# Patient Record
Sex: FEMALE | Race: WHITE | NOT HISPANIC OR LATINO | Employment: FULL TIME | URBAN - METROPOLITAN AREA
[De-identification: names, ages, dates, MRNs, and addresses within clinical notes are randomized per-mention and may not be internally consistent; named-entity substitution may affect disease eponyms.]

---

## 2017-01-17 ENCOUNTER — OFFICE VISIT (OUTPATIENT)
Dept: OBGYN | Facility: CLINIC | Age: 42
End: 2017-01-17
Payer: COMMERCIAL

## 2017-01-17 VITALS
BODY MASS INDEX: 23.35 KG/M2 | HEIGHT: 64 IN | HEART RATE: 64 BPM | SYSTOLIC BLOOD PRESSURE: 98 MMHG | DIASTOLIC BLOOD PRESSURE: 76 MMHG | WEIGHT: 136.8 LBS

## 2017-01-17 DIAGNOSIS — Z09 POSTOPERATIVE EXAMINATION: ICD-10-CM

## 2017-01-17 DIAGNOSIS — Z09 POSTOPERATIVE EXAMINATION: Primary | ICD-10-CM

## 2017-01-17 LAB — HGB BLD-MCNC: 14.1 G/DL (ref 11.7–15.7)

## 2017-01-17 PROCEDURE — 36415 COLL VENOUS BLD VENIPUNCTURE: CPT | Performed by: OBSTETRICS & GYNECOLOGY

## 2017-01-17 PROCEDURE — 99024 POSTOP FOLLOW-UP VISIT: CPT | Performed by: OBSTETRICS & GYNECOLOGY

## 2017-01-17 PROCEDURE — 85018 HEMOGLOBIN: CPT | Performed by: OBSTETRICS & GYNECOLOGY

## 2017-01-17 NOTE — PROGRESS NOTES
SUBJECTIVE:                                                   Lolita Harris is a 41 year old female who presents to clinic today for the following health issue(s):  Patient presents with:  Surgical Followup: 16 Diagnostic laparoscopy, CO2 laser lysis of adhesions, CO2 laser vaporization of scar tissue and endometriosis.          HPI:  ***    No LMP recorded. Patient has had a hysterectomy..   Patient is not sexually active, .  Using hysterectomy for contraception.    reports that she has never smoked. She has never used smokeless tobacco.    STD testing offered?  Declined - not sexually active    Health maintenance updated:  yes    Today's PHQ-2 Score:   PHQ-2 (  [employer]) 2015   Q1: Little interest or pleasure in doing things 0   Q2: Feeling down, depressed or hopeless 0   PHQ-2 Score 0     Today's PHQ-9 Score: No flowsheet data found.  Today's PIPO-7 Score: No flowsheet data found.    Problem list and histories reviewed & adjusted, as indicated.  Additional history: as documented.    Patient Active Problem List   Diagnosis     Pelvic pain in female     Past Surgical History   Procedure Laterality Date     Laparoscopy operative adult  95,97,2001 rso,10 darlyn     Laparoscopy,vag hysterectomy       lso      section       Laser co2 laparoscopy diagnostic, lysis adhesions, combined N/A 2016     Procedure: COMBINED LASER CO2 LAPAROSCOPY DIAGNOSTIC, LYSIS ADHESIONS;  Surgeon: Zay Archuleta MD;  [facility]: [facility]      Social History   Substance Use Topics     Smoking status: Never Smoker      Smokeless tobacco: Never Used     Alcohol Use: 0.0 oz/week     0 Standard drinks or equivalent per week      Comment: rarely      Problem (# of Occurrences) Relation (Name,Age of Onset)    Hyperlipidemia (1) Father    Thyroid Disease (1) Father            Current Outpatient Prescriptions   Medication Sig     estradiol (ESTRACE) 1 MG tablet Take 1 tablet (1 mg) by mouth daily      "Calcium-Magnesium-Zinc 333-133-5 MG TABS      guaiFENesin (MUCINEX) 600 MG 12 hr tablet      levothyroxine (SYNTHROID, LEVOTHROID) 75 MCG tablet      fluticasone (FLONASE) 50 MCG/ACT nasal spray as needed     No current facility-administered medications for this visit.     Allergies   Allergen Reactions     Penicillins Rash     Childhood rxn       ROS:  12 point review of systems negative other than symptoms noted below.  Gastrointestinal: Abdominal Pain    OBJECTIVE:     BP 98/76 mmHg  Pulse 64  Ht 5' 4\" (1.626 m)  Wt 136 lb 12.8 oz (62.052 kg)  BMI 23.47 kg/m2  Body mass index is 23.47 kg/(m^2).    Exam:  {[facility] EXAM CHOICES:092454}     In-Clinic Test Results:  Results for orders placed or performed in visit on 01/17/17 (from the past 24 hour(s))   Hemoglobin   Result Value Ref Range    Hemoglobin 14.1 11.7 - 15.7 g/dL       ASSESSMENT/PLAN:                                                        ICD-10-CM    1. Postoperative examination Z09 Hemoglobin       There are no Patient Instructions on file for this visit.    ***    Zay Archuleta MD  [facility]    "

## 2017-01-17 NOTE — PROGRESS NOTES
The patient is seen for her postoperative visit. Her incisions look good. The operative findings were reviewed at length. She will return to see us in 3 months time.

## 2017-03-20 DIAGNOSIS — N95.1 MENOPAUSAL AND FEMALE CLIMACTERIC STATES: ICD-10-CM

## 2017-03-20 RX ORDER — ESTRADIOL 1 MG/1
1 TABLET ORAL DAILY
Qty: 90 TABLET | Refills: 1 | Status: SHIPPED | OUTPATIENT
Start: 2017-03-20 | End: 2020-11-14

## 2017-04-17 ENCOUNTER — OFFICE VISIT (OUTPATIENT)
Dept: OBGYN | Facility: CLINIC | Age: 42
End: 2017-04-17
Payer: COMMERCIAL

## 2017-04-17 VITALS
WEIGHT: 136 LBS | HEART RATE: 60 BPM | SYSTOLIC BLOOD PRESSURE: 100 MMHG | BODY MASS INDEX: 23.22 KG/M2 | DIASTOLIC BLOOD PRESSURE: 56 MMHG | HEIGHT: 64 IN

## 2017-04-17 DIAGNOSIS — N80.9 ENDOMETRIOSIS: Primary | ICD-10-CM

## 2017-04-17 PROCEDURE — 99213 OFFICE O/P EST LOW 20 MIN: CPT | Performed by: OBSTETRICS & GYNECOLOGY

## 2017-04-17 NOTE — PROGRESS NOTES
SUBJECTIVE:                                                   Lolita Harris is a 42 year old female who presents to clinic today for the following health issue(s):  Patient presents with:  Surgical Followup: CO2 Laser 12/27/16    HPI: The patient is seen in follow-up for a long history of endometriosis. She has had numerous procedures and a eventual complete cleanout. Her last surgery was in December. She is improved at this time. She is on 1 mg of estrogen replacement q. day and stable.Constitutional:  Appearance: Well nourished, well developed alert, in no acute distress  Gastrointestinal:  Abdominal Examination:  Abdomen nontender to palpation, tone normal without rigidity or guarding, no masses present, umbilicus without lesions; Liver/Spleen:  No hepatomegaly present, liver nontender to palpation; Hernias:  No hernias present  Lymphatic: Lymph Nodes:  No other lymphadenopathy present  Skin:General Inspection:  No rashes present, no lesions present, no areas of discoloration; Genitalia and Groin:  No rashes present, no lesions present, no areas of discoloration, no masses present.  Neurologic/Psychiatric:  Mental Status:  Oriented X3   Pelvic Exam:  External Genitalia:     Normal appearance for age, no discharge present, no tenderness present, no inflammatory lesions present, color normal  Vagina:     Normal vaginal vault without central or paravaginal defects, no discharge present, no inflammatory lesions present, no masses present  Bladder:     Nontender to palpation  Urethra:   Urethral Body:  Urethra palpation normal, urethra structural support normal   Urethral Meatus:  No erythema or lesions present  Cervix:     Surgically absent  Uterus:     Surgically absent  Adnexa:     Surgically absent  Perineum:     Perineum within normal limits, no evidence of trauma, no rashes or skin lesions present  Anus:     Anus within normal limits, no hemorrhoids present  Inguinal Lymph Nodes:     No lymphadenopathy  present      No LMP recorded. Patient has had a hysterectomy..   Patient is sexually active, .  Using hysterectomy for contraception.    reports that she has never smoked. She has never used smokeless tobacco.    STD testing offered?  Declined    Health maintenance updated:  yes    Today's PHQ-2 Score:   PHQ-2 (  [employer]) 2015   Q1: Little interest or pleasure in doing things 0   Q2: Feeling down, depressed or hopeless 0   PHQ-2 Score 0     Today's PHQ-9 Score: No flowsheet data found.  Today's PIPO-7 Score: No flowsheet data found.    Problem list and histories reviewed & adjusted, as indicated.  Additional history: as documented.    Patient Active Problem List   Diagnosis     Pelvic pain in female     Past Surgical History:   Procedure Laterality Date      SECTION       LAPAROSCOPY OPERATIVE ADULT  95,97,2001 rso,10 darlyn     LAPAROSCOPY,VAG HYSTERECTOMY      lso     LASER CO2 LAPAROSCOPY DIAGNOSTIC, LYSIS ADHESIONS, COMBINED N/A 2016    Procedure: COMBINED LASER CO2 LAPAROSCOPY DIAGNOSTIC, LYSIS ADHESIONS;  Surgeon: Zay Archuleta MD;  Location: [facility]      Social History   Substance Use Topics     Smoking status: Never Smoker     Smokeless tobacco: Never Used     Alcohol use 0.0 oz/week     0 Standard drinks or equivalent per week      Comment: rarely      Problem (# of Occurrences) Relation (Name,Age of Onset)    Hyperlipidemia (1) Father    Thyroid Disease (1) Father            Current Outpatient Prescriptions   Medication Sig     estradiol (ESTRACE) 1 MG tablet Take 1 tablet (1 mg) by mouth daily     Calcium-Magnesium-Zinc 333-133-5 MG TABS      guaiFENesin (MUCINEX) 600 MG 12 hr tablet      levothyroxine (SYNTHROID, LEVOTHROID) 75 MCG tablet      fluticasone (FLONASE) 50 MCG/ACT nasal spray as needed     No current facility-administered medications for this visit.      Allergies   Allergen Reactions     Penicillins Rash     Childhood rxn       ROS:  12 point review of systems  "negative other than symptoms noted below.    OBJECTIVE:     /56  Pulse 60  Ht 5' 4\" (1.626 m)  Wt 136 lb (61.7 kg)  Breastfeeding? No  BMI 23.34 kg/m2  Body mass index is 23.34 kg/(m^2).    Exam:  Constitutional:  Appearance: Well nourished, well developed alert, in no acute distress  Gastrointestinal:  Abdominal Examination:  Abdomen nontender to palpation, tone normal without rigidity or guarding, no masses present, umbilicus without lesions; Liver/Spleen:  No hepatomegaly present, liver nontender to palpation; Hernias:  No hernias present  Lymphatic: Lymph Nodes:  No other lymphadenopathy present  Skin:General Inspection:  No rashes present, no lesions present, no areas of discoloration; Genitalia and Groin:  No rashes present, no lesions present, no areas of discoloration, no masses present.  Neurologic/Psychiatric:  Mental Status:  Oriented X3   Pelvic Exam:  External Genitalia:     Normal appearance for age, no discharge present, no tenderness present, no inflammatory lesions present, color normal  Vagina:     Normal vaginal vault without central or paravaginal defects, no discharge present, no inflammatory lesions present, no masses present  Bladder:     Nontender to palpation  Urethra:   Urethral Body:  Urethra palpation normal, urethra structural support normal   Urethral Meatus:  No erythema or lesions present  Cervix:     Surgically absent  Uterus:     Surgically absent  Adnexa:     Surgically absent  Perineum:     Perineum within normal limits, no evidence of trauma, no rashes or skin lesions present  Anus:     Anus within normal limits, no hemorrhoids present  Inguinal Lymph Nodes:     No lymphadenopathy present     In-Clinic Test Results:      ASSESSMENT/PLAN:                                                      The patient is stable at this time with a totally benign exam. We will see her back in 6 months. We have asked her to cut her estrogen and have for September and " October.            Zay Archuleta MD  [facility]

## 2017-04-17 NOTE — MR AVS SNAPSHOT
"              After Visit Summary   2017    Lolita Harris    MRN: [id number]           Patient Information     Date Of Birth          1975        Visit Information        Provider Department      2017 10:45 AM Zay Archuleta MD [facility]        Today's Diagnoses     Endometriosis    -  1       Follow-ups after your visit        Who to contact     If you have questions or need follow up information about today's clinic visit or your schedule please contact [facility] directly at [phone].  Normal or non-critical lab and imaging results will be communicated to you by Knimb[employer]art, letter or phone within 4 business days after the clinic has received the results. If you do not hear from us within 7 days, please contact the clinic through [employer]imbushart or phone. If you have a critical or abnormal lab result, we will notify you by phone as soon as possible.  Submit refill requests through All [employer] Leads or call your pharmacy and they will forward the refill request to us. Please allow 3 business days for your refill to be completed.          Additional Information About Your Visit        MyChart Information     [employer] lets you send messages to your doctor, view your test results, renew your prescriptions, schedule appointments and more. To sign up, go to [email or web address] . Click on \"Log in\" on the left side of the screen, which will take you to the Welcome page. Then click on \"Sign up Now\" on the right side of the page.     You will be asked to enter the access code listed below, as well as some personal information. Please follow the directions to create your username and password.     Your access code is: [email or web address]  Expires: 2017 11:10 AM     Your access code will  in 90 days. If you need help or a new code, please call your [facility] or [phone].        Care EveryWhere ID     This is your Care EveryWhere ID. This could be used by other " "organizations to access your [facility] medical records  [id number]        Your Vitals Were     Pulse Height Breastfeeding? BMI (Body Mass Index)          60 5' 4\" (1.626 m) No 23.34 kg/m2         Blood Pressure from Last 3 Encounters:   04/17/17 100/56   01/17/17 98/76   12/27/16 121/82    Weight from Last 3 Encounters:   04/17/17 136 lb (61.7 kg)   01/17/17 136 lb 12.8 oz (62.1 kg)   12/27/16 137 lb 8 oz (62.4 kg)              Today, you had the following     No orders found for display       Primary Care Provider    None Specified       No primary provider on file.        Thank you!     Thank you for choosing [facility]  for your care. Our goal is always to provide you with excellent care. Hearing back from our patients is one way we can continue to improve our services. Please take a few minutes to complete the written survey that you may receive in the mail after your visit with us. Thank you!             Your Updated Medication List - Protect others around you: Learn how to safely use, store and throw away your medicines at www.disposemymeds.org.          This list is accurate as of: 4/17/17 12:19 PM.  Always use your most recent med list.                   Brand Name Dispense Instructions for use    Calcium-Magnesium-Zinc 333-133-5 MG Tabs          estradiol 1 MG tablet    ESTRACE    90 tablet    Take 1 tablet (1 mg) by mouth daily       fluticasone 50 MCG/ACT spray    FLONASE     as needed       levothyroxine 75 MCG tablet    SYNTHROID/LEVOTHROID         MUCINEX 600 MG 12 hr tablet   Generic drug:  guaiFENesin            "

## 2018-09-06 ENCOUNTER — TRANSFERRED RECORDS (OUTPATIENT)
Dept: HEALTH INFORMATION MANAGEMENT | Facility: CLINIC | Age: 43
End: 2018-09-06

## 2018-12-24 ENCOUNTER — OFFICE VISIT (OUTPATIENT)
Dept: RHEUMATOLOGY | Facility: CLINIC | Age: 43
End: 2018-12-24
Payer: COMMERCIAL

## 2018-12-24 VITALS
DIASTOLIC BLOOD PRESSURE: 63 MMHG | HEART RATE: 69 BPM | RESPIRATION RATE: 16 BRPM | TEMPERATURE: 97.2 F | WEIGHT: 139 LBS | BODY MASS INDEX: 23.73 KG/M2 | OXYGEN SATURATION: 97 % | HEIGHT: 64 IN | SYSTOLIC BLOOD PRESSURE: 99 MMHG

## 2018-12-24 DIAGNOSIS — M06.9 RHEUMATOID ARTHRITIS INVOLVING MULTIPLE SITES, UNSPECIFIED RHEUMATOID FACTOR PRESENCE: Primary | ICD-10-CM

## 2018-12-24 DIAGNOSIS — M19.90 INFLAMMATORY ARTHRITIS: ICD-10-CM

## 2018-12-24 DIAGNOSIS — D70.4 CYCLICAL NEUTROPENIA (H): ICD-10-CM

## 2018-12-24 LAB
ALT SERPL W P-5'-P-CCNC: 31 U/L (ref 0–50)
AST SERPL W P-5'-P-CCNC: 27 U/L (ref 0–45)
BASOPHILS # BLD AUTO: 0 10E9/L (ref 0–0.2)
BASOPHILS NFR BLD AUTO: 0.9 %
C3 SERPL-MCNC: 100 MG/DL (ref 76–169)
C4 SERPL-MCNC: 21 MG/DL (ref 15–50)
CREAT SERPL-MCNC: 0.77 MG/DL (ref 0.52–1.04)
CRP SERPL-MCNC: <2.9 MG/L (ref 0–8)
DIFFERENTIAL METHOD BLD: ABNORMAL
ENA RNP IGG SER IA-ACNC: <0.2 AI (ref 0–0.9)
ENA SCL70 IGG SER IA-ACNC: <0.2 AI (ref 0–0.9)
ENA SM IGG SER-ACNC: <0.2 AI (ref 0–0.9)
ENA SS-A IGG SER IA-ACNC: <0.2 AI (ref 0–0.9)
ENA SS-B IGG SER IA-ACNC: <0.2 AI (ref 0–0.9)
EOSINOPHIL # BLD AUTO: 0 10E9/L (ref 0–0.7)
EOSINOPHIL NFR BLD AUTO: 0.9 %
ERYTHROCYTE [DISTWIDTH] IN BLOOD BY AUTOMATED COUNT: 11.9 % (ref 10–15)
ERYTHROCYTE [SEDIMENTATION RATE] IN BLOOD BY WESTERGREN METHOD: 5 MM/H (ref 0–20)
GFR SERPL CREATININE-BSD FRML MDRD: >90 ML/MIN/{1.73_M2}
HCT VFR BLD AUTO: 41.5 % (ref 35–47)
HGB BLD-MCNC: 13.6 G/DL (ref 11.7–15.7)
IMM GRANULOCYTES # BLD: 0 10E9/L (ref 0–0.4)
IMM GRANULOCYTES NFR BLD: 0 %
LYMPHOCYTES # BLD AUTO: 1.4 10E9/L (ref 0.8–5.3)
LYMPHOCYTES NFR BLD AUTO: 41.1 %
MCH RBC QN AUTO: 29.8 PG (ref 26.5–33)
MCHC RBC AUTO-ENTMCNC: 32.8 G/DL (ref 31.5–36.5)
MCV RBC AUTO: 91 FL (ref 78–100)
MONOCYTES # BLD AUTO: 0.3 10E9/L (ref 0–1.3)
MONOCYTES NFR BLD AUTO: 7.9 %
NEUTROPHILS # BLD AUTO: 1.6 10E9/L (ref 1.6–8.3)
NEUTROPHILS NFR BLD AUTO: 49.2 %
PLATELET # BLD AUTO: 199 10E9/L (ref 150–450)
RBC # BLD AUTO: 4.57 10E12/L (ref 3.8–5.2)
RHEUMATOID FACT SER NEPH-ACNC: <20 IU/ML (ref 0–20)
WBC # BLD AUTO: 3.3 10E9/L (ref 4–11)

## 2018-12-24 PROCEDURE — 84460 ALANINE AMINO (ALT) (SGPT): CPT | Performed by: STUDENT IN AN ORGANIZED HEALTH CARE EDUCATION/TRAINING PROGRAM

## 2018-12-24 PROCEDURE — 84450 TRANSFERASE (AST) (SGOT): CPT | Performed by: STUDENT IN AN ORGANIZED HEALTH CARE EDUCATION/TRAINING PROGRAM

## 2018-12-24 PROCEDURE — 86704 HEP B CORE ANTIBODY TOTAL: CPT | Performed by: STUDENT IN AN ORGANIZED HEALTH CARE EDUCATION/TRAINING PROGRAM

## 2018-12-24 PROCEDURE — 87340 HEPATITIS B SURFACE AG IA: CPT | Performed by: STUDENT IN AN ORGANIZED HEALTH CARE EDUCATION/TRAINING PROGRAM

## 2018-12-24 PROCEDURE — 86200 CCP ANTIBODY: CPT | Performed by: STUDENT IN AN ORGANIZED HEALTH CARE EDUCATION/TRAINING PROGRAM

## 2018-12-24 PROCEDURE — 86140 C-REACTIVE PROTEIN: CPT | Performed by: STUDENT IN AN ORGANIZED HEALTH CARE EDUCATION/TRAINING PROGRAM

## 2018-12-24 PROCEDURE — 86803 HEPATITIS C AB TEST: CPT | Performed by: STUDENT IN AN ORGANIZED HEALTH CARE EDUCATION/TRAINING PROGRAM

## 2018-12-24 PROCEDURE — 85652 RBC SED RATE AUTOMATED: CPT | Performed by: STUDENT IN AN ORGANIZED HEALTH CARE EDUCATION/TRAINING PROGRAM

## 2018-12-24 PROCEDURE — 86160 COMPLEMENT ANTIGEN: CPT | Performed by: STUDENT IN AN ORGANIZED HEALTH CARE EDUCATION/TRAINING PROGRAM

## 2018-12-24 PROCEDURE — 86038 ANTINUCLEAR ANTIBODIES: CPT | Performed by: STUDENT IN AN ORGANIZED HEALTH CARE EDUCATION/TRAINING PROGRAM

## 2018-12-24 PROCEDURE — 99204 OFFICE O/P NEW MOD 45 MIN: CPT | Performed by: STUDENT IN AN ORGANIZED HEALTH CARE EDUCATION/TRAINING PROGRAM

## 2018-12-24 PROCEDURE — 86431 RHEUMATOID FACTOR QUANT: CPT | Performed by: STUDENT IN AN ORGANIZED HEALTH CARE EDUCATION/TRAINING PROGRAM

## 2018-12-24 PROCEDURE — 86225 DNA ANTIBODY NATIVE: CPT | Performed by: STUDENT IN AN ORGANIZED HEALTH CARE EDUCATION/TRAINING PROGRAM

## 2018-12-24 PROCEDURE — 86480 TB TEST CELL IMMUN MEASURE: CPT | Performed by: STUDENT IN AN ORGANIZED HEALTH CARE EDUCATION/TRAINING PROGRAM

## 2018-12-24 PROCEDURE — 36415 COLL VENOUS BLD VENIPUNCTURE: CPT | Performed by: STUDENT IN AN ORGANIZED HEALTH CARE EDUCATION/TRAINING PROGRAM

## 2018-12-24 PROCEDURE — 82565 ASSAY OF CREATININE: CPT | Performed by: STUDENT IN AN ORGANIZED HEALTH CARE EDUCATION/TRAINING PROGRAM

## 2018-12-24 PROCEDURE — 86235 NUCLEAR ANTIGEN ANTIBODY: CPT | Performed by: STUDENT IN AN ORGANIZED HEALTH CARE EDUCATION/TRAINING PROGRAM

## 2018-12-24 PROCEDURE — 85025 COMPLETE CBC W/AUTO DIFF WBC: CPT | Performed by: STUDENT IN AN ORGANIZED HEALTH CARE EDUCATION/TRAINING PROGRAM

## 2018-12-24 RX ORDER — PREDNISONE 5 MG/1
TABLET ORAL
Qty: 90 TABLET | Refills: 2 | Status: SHIPPED | OUTPATIENT
Start: 2018-12-24 | End: 2019-05-28

## 2018-12-24 ASSESSMENT — PAIN SCALES - GENERAL: PAINLEVEL: MILD PAIN (3)

## 2018-12-24 ASSESSMENT — MIFFLIN-ST. JEOR: SCORE: 1270.5

## 2018-12-24 NOTE — PATIENT INSTRUCTIONS
Patient Education     Hydroxychloroquine tablets  Brand Names: Plaquenil, Quineprox  What is this medicine?  HYDROXYCHLOROQUINE (ondina drox ee KLOR oh kwin) is used to treat rheumatoid arthritis and systemic lupus erythematosus. It is also used to treat malaria.  How should I use this medicine?  Take this medicine by mouth with a glass of water. Follow the directions on the prescription label. Avoid taking antacids within 4 hours of taking this medicine. It is best to separate these medicines by at least 4 hours. Do not cut, crush or chew this medicine. You can take it with or without food. If it upsets your stomach, take it with food. Take your medicine at regular intervals. Do not take your medicine more often than directed. Take all of your medicine as directed even if you think you are better. Do not skip doses or stop your medicine early.  Talk to your pediatrician regarding the use of this medicine in children. While this drug may be prescribed for selected conditions, precautions do apply.  What side effects may I notice from receiving this medicine?  Side effects that you should report to your doctor or health care professional as soon as possible:    allergic reactions like skin rash, itching or hives, swelling of the face, lips, or tongue    changes in vision    decreased hearing or ringing of the ears    redness, blistering, peeling or loosening of the skin, including inside the mouth    seizures    sensitivity to light    signs and symptoms of a dangerous change in heartbeat or heart rhythm like chest pain; dizziness; fast or irregular heartbeat; palpitations; feeling faint or lightheaded, falls; breathing problems    signs and symptoms of liver injury like dark yellow or brown urine; general ill feeling or flu-like symptoms; light-colored stools; loss of appetite; nausea; right upper belly pain; unusually weak or tired; yellowing of the eyes or skin    signs and symptoms of low blood sugar such as  feeling anxious; confusion; dizziness; increased hunger; unusually weak or tired; sweating; shakiness; cold; irritable; headache; blurred vision; fast heartbeat; loss of consciousness    uncontrollable head, mouth, neck, arm, or leg movements  Side effects that usually do not require medical attention (report to your doctor or health care professional if they continue or are bothersome):    anxious    diarrhea    dizziness    hair loss    headache    irritable    loss of appetite    nausea, vomiting    stomach pain  What may interact with this medicine?  Do not take this medicine with any of the following medications:    cisapride    dofetilide    dronedarone    live virus vaccines    penicillamine    pimozide    thioridazine    ziprasidone  This medicine may also interact with the following medications:    ampicillin    antacids    cimetidine    cyclosporine    digoxin    medicines for diabetes, like insulin, glipizide, glyburide    medicines for seizures like carbamazepine, phenobarbital, phenytoin    mefloquine    methotrexate    other medicines that prolong the QT interval (cause an abnormal heart rhythm)    praziquantel  What if I miss a dose?  If you miss a dose, take it as soon as you can. If it is almost time for your next dose, take only that dose. Do not take double or extra doses.  Where should I keep my medicine?  Keep out of the reach of children. In children, this medicine can cause overdose with small doses.  Store at room temperature between 15 and 30 degrees C (59 and 86 degrees F). Protect from moisture and light. Throw away any unused medicine after the expiration date.  What should I tell my health care provider before I take this medicine?  They need to know if you have any of these conditions:    diabetes    eye disease, vision problems    G6PD deficiency    history of blood diseases    history of irregular heartbeat    if you often drink alcohol    kidney disease    liver  disease    porphyria    psoriasis    seizures    an unusual or allergic reaction to chloroquine, hydroxychloroquine, other medicines, foods, dyes, or preservatives    pregnant or trying to get pregnant    breast-feeding  What should I watch for while using this medicine?  Tell your doctor or healthcare professional if your symptoms do not start to get better or if they get worse.  Avoid taking antacids within 4 hours of taking this medicine. It is best to separate these medicines by at least 4 hours.  Tell your doctor or health care professional right away if you have any change in your eyesight.  Your vision and blood may be tested before and during use of this medicine.  This medicine can make you more sensitive to the sun. Keep out of the sun. If you cannot avoid being in the sun, wear protective clothing and use sunscreen. Do not use sun lamps or tanning beds/booths.  NOTE:This sheet is a summary. It may not cover all possible information. If you have questions about this medicine, talk to your doctor, pharmacist, or health care provider. Copyright  2018 [employer]

## 2018-12-24 NOTE — NURSING NOTE
"Lolita Harris's goals for this visit include: Consult  She requests these members of her care team be copied on today's visit information: PCP    PCP: No Ref-Primary, Physician    Referring Provider:  No referring provider defined for this encounter.    BP 99/63   Pulse 69   Temp 97.2  F (36.2  C)   Resp 16   Ht 1.626 m (5' 4\")   Wt 63 kg (139 lb)   SpO2 97%   BMI 23.86 kg/m      Do you need any medication refills at today's visit? N    "

## 2018-12-24 NOTE — PROGRESS NOTES
Rheumatology Clinic Visit     Lolita Harris MRN# [id number]   YOB: 1975 Age: 43 year old     Date of Visit: Dec 24, 2018  Primary care provider: No Ref-Primary, Physician          Assessment and Plan:   Assessment     -- Polyarthritis   -- Hx of positive ACPA  -- Leukopenia   -- Hypothyroidism    Ms Harris is 42 yo female seen in clinic for evaluation of joint pains.     Polyarthritis : C/o pain in her joints mainly right index finger, wrists, knees, hips for 6 months along with stiffness.     On physical exam she has tenderness on palpation over the right index finger PIP joint, right fifth PIP joint.  Mild tenderness present over the right wrist, right elbow.  Mild tenderness present over the left fifth PIP joint.  Tenderness present over bilateral ankles and MTP joints.  Normal range of motion of bilateral knees, hips and shoulders.    Based on her physical exam and clinical history the diagnosis of inflammatory arthritis seems likely. She has anti-CCP antibody positive in September 2018 with negative rheumatoid factor.    I will repeat rheumatoid serologies, inflammatory markers today.  She also has persistent leukopenia and will screen for autoimmune connective tissue disease with ADRIANNA, VIVIAN panel, complement levels and double-stranded DNA.    I will give her short prednisone taper starting with 20 mg and tapering down by 5 mg every 5 days to the lowest effective dose.    Information about hydroxychloroquine provided. Discussed benefits and risks of the drug. Plaquenil has been shown to reduce lupus flares by 50%, prevent lupus involvement of brain/kidneys and to prolong life. It has anti-thrombotic effect. Informed that Plaquenil has a rare side effect of retinal toxicity (1/40,000 before 5 yr of use), the risk could go up to 1% after 5 yr of continous use and is more with doses above 5mg/kg. It requires an annual eye exam(visual field and OCT).   Was informed that it's slow acting and might  take to 3 months to show benefit.    Plan    1.  Blood tests: Rheumatoid factor, anti-CCP, ESR, CRP, CBC, AST, ALT, creatinine, ADRIANNA, VIVIAN panel, complements, double-stranded DNA, hepatitis B/C, TB QuantiFERON gold    2.  Short prednisone taper. 4tab=20 mg qd x 5 days, 3tab=15 mg qd x 5 days, 2tab=10 mg qd x 5 days, 1 tab=5 mg qd x 5 days then stop       3. Information about hydroxychloroquine provided    4.  Return to clinic in 2 months.          Active Problem List:     Patient Active Problem List    Diagnosis Date Noted     Inflammatory arthritis      Priority: Medium     Pelvic pain in female 12/28/2015     Priority: Medium            History of Present Illness:   Lolita Harris is a 43 year old female with PMH of hypothyroidism, Leukopenia seen in the clinic in consultation at request of Murali Smith for evaluation of joint pains.     She has swelling in her right index finger since summer 2018, others fingers are mildly stiff as well. Wrists are sore. Left hand is slightly sore. Mostly in the morning, better as the day goes on. Once in a while she has pain in her ankles. She has back pain and muscle tightness. Can not do exercises by moving her shoulders over her head. If she sit too long she has pain in her hips. She has knee pain for 3 - 4 years.     She has fatigue for years. C/o dry eyes and has inflammation in her gums due to lichen Planus. She has recurrent sinus issues, nose feels congested. She has photosensitivity and burn easily in the sun.      She has seen Dr Ledezma in Rheumatology in 2015 for evaluation of lupus in the setting of Leukopenia. Her ADRIANNA, ACPA, RF, ESR were negative at the time and no diagnosis of autoimmune connective tissue was made. She has seen hematologist for leukopenia in the past and had bone marrow biopsy which was normal.   No history of psoriasis, ulcerative colitis or chron's disease. No h/o iritis, enthesitis, finger or toe swelling like dactylitis, plantar fascitis or  heel pain. Denies any raynauds, malar rash, photosensitivity, recurrent mouth/genital ulcers, sicca symptoms, pleuritic chest pains, swallowing difficulty, hearing or visual changes recently.  No h/o arterial/venous thrombosis in the past. Denies any tick bite, recent GI/ infection.             Review of Systems:   Review Of Systems  Constitutional: denies fever, chills, night sweats and weight loss.  Skin: No skin rash.  Eyes: + dryness or irritation in eyes. No episode of eye inflammation or redness.   Ears/Nose/Throat: no recurrent sinus infections.  Respiratory: No shortness of breath, dyspnea on exertion, cough, or hemoptysis  Cardiovascular: no chest pain or palpitations.  Gastrointestinal: no nausea, vomiting, abdominal pain.  Normal bowel movements.  Genitourinary: no dysuria, frequency  or hematuria.  Musculoskeletal: as in HPI  Neurologic: no numbness, tingling.  Psychiatric: no mood disorders.  Hematologic/Lymphatic/Immunologic: no history of easy bruising, petechia or purpura.  No abnormal bleeding.   Endocrine: + h/o thyroid disease or Diabetes.                  Past Medical History:     Past Medical History:   Diagnosis Date     Abnormal glandular Papanicolaou smear of cervix      Endometriosis      Hypothyroid      Inflammatory arthritis      PONV (postoperative nausea and vomiting)      Past Surgical History:   Procedure Laterality Date      SECTION       LAPAROSCOPY OPERATIVE ADULT  95,97,2001 rso,10 darlyn     LAPAROSCOPY,VAG HYSTERECTOMY      lso     LASER CO2 LAPAROSCOPY DIAGNOSTIC, LYSIS ADHESIONS, COMBINED N/A 2016    Procedure: COMBINED LASER CO2 LAPAROSCOPY DIAGNOSTIC, LYSIS ADHESIONS;  Surgeon: Zay Archuleta MD;  Location: [facility]            Social History:     Social History     Occupational History     Not on file   Tobacco Use     Smoking status: Never Smoker     Smokeless tobacco: Never Used   Substance and Sexual Activity     Alcohol use: Yes     Alcohol/week: 0.0 oz  "    Comment: rarely     Drug use: No     Sexual activity: Yes     Partners: Male     Birth control/protection: Female Surgical     Comment: hysterectomy            Family History:     Family History   Problem Relation Age of Onset     Hyperlipidemia Father      Thyroid Disease Father             Allergies:     Allergies   Allergen Reactions     Penicillins Rash     Childhood rxn            Medications:     Current Outpatient Medications   Medication Sig Dispense Refill     Calcium-Magnesium-Zinc 333-133-5 MG TABS        fluticasone (FLONASE) 50 MCG/ACT nasal spray as needed  0     guaiFENesin (MUCINEX) 600 MG 12 hr tablet        levothyroxine (SYNTHROID, LEVOTHROID) 75 MCG tablet Take 88 mcg by mouth daily        Omega-3 Fatty Acids (FISH OIL PO) Take by mouth daily       predniSONE (DELTASONE) 5 MG tablet 4tab=20 mg qd x 5 days, 3tab=15 mg qd x 5 days, 2tab=10 mg qd x 5 days, 1 tab=5 mg qd x 5 days then stop. 90 tablet 2     estradiol (ESTRACE) 1 MG tablet Take 1 tablet (1 mg) by mouth daily (Patient not taking: Reported on 12/24/2018) 90 tablet 1            Physical Exam:   Blood pressure 99/63, pulse 69, temperature 97.2  F (36.2  C), resp. rate 16, height 1.626 m (5' 4\"), weight 63 kg (139 lb), SpO2 97 %, not currently breastfeeding.  Wt Readings from Last 4 Encounters:   12/24/18 63 kg (139 lb)   04/17/17 61.7 kg (136 lb)   01/17/17 62.1 kg (136 lb 12.8 oz)   12/27/16 62.4 kg (137 lb 8 oz)       Constitutional: well-developed, appearing stated age; cooperative  Eyes: nl EOM, PERRLA, vision, conjunctiva, sclera  ENT: nl external ears, nose, hearing, lips, teeth, gums, throat  No mucous membrane lesions, normal saliva pool  Neck: no mass or thyroid enlargement  Resp: lungs clear to auscultation, nl to palpation  CV: RRR, no murmurs, rubs or gallops, no edema  GI: no ABD mass or tenderness, no HSM  : not tested  Lymph: no cervical, supraclavicular, inguinal or epitrochlear nodes    MS: All TMJ, neck, shoulder, " elbow, wrist, MCP/PIP/DIP, spine, hip, knee, ankle, and foot MTP/IP joints were examined.     -- She has tenderness on palpation over the right index finger PIP joints, and the right fifth PIP joint.  Mild tenderness present over the right wrist, right elbow.  Mild tenderness present over the left fifth PIP joint.  Tenderness present over bilateral ankles and MTP joints.  Normal range of motion of bilateral knees, hips and shoulders.    -- No active synovitis or deformity. Full ROM.  Normal  strength.     -- No dactylitis,  tenosynovitis, enthesopathy.    Skin: no nail pitting, alopecia, rash, nodules or lesions  Neuro: nl cranial nerves, strength, sensation, DTRs.   Psych: nl judgement, orientation, memory, affect.         Data:     Results for orders placed or performed in visit on 12/24/18   CBC with platelets differential   Result Value Ref Range    WBC 3.3 (L) 4.0 - 11.0 10e9/L    RBC Count 4.57 3.8 - 5.2 10e12/L    Hemoglobin 13.6 11.7 - 15.7 g/dL    Hematocrit 41.5 35.0 - 47.0 %    MCV 91 78 - 100 fl    MCH 29.8 26.5 - 33.0 pg    MCHC 32.8 31.5 - 36.5 g/dL    RDW 11.9 10.0 - 15.0 %    Platelet Count 199 150 - 450 10e9/L    Diff Method Automated Method     % Neutrophils 49.2 %    % Lymphocytes 41.1 %    % Monocytes 7.9 %    % Eosinophils 0.9 %    % Basophils 0.9 %    % Immature Granulocytes 0.0 %    Absolute Neutrophil 1.6 1.6 - 8.3 10e9/L    Absolute Lymphocytes 1.4 0.8 - 5.3 10e9/L    Absolute Monocytes 0.3 0.0 - 1.3 10e9/L    Absolute Eosinophils 0.0 0.0 - 0.7 10e9/L    Absolute Basophils 0.0 0.0 - 0.2 10e9/L    Abs Immature Granulocytes 0.0 0 - 0.4 10e9/L       Recent Labs   Lab Test 01/17/17  1402 12/19/16  1621   HGB 14.1 13.7      No results for input(s): TSH, T4 in the last 13139 hours.  Hemoglobin   Date Value Ref Range Status   12/24/2018 13.6 11.7 - 15.7 g/dL Final   01/17/2017 14.1 11.7 - 15.7 g/dL Final   12/19/2016 13.7 11.7 - 15.7 g/dL Final     Recent Labs   Lab Test 12/24/18  0857  01/17/17  1402 12/19/16  1621   WBC 3.3*  --   --    HGB 13.6 14.1 13.7   HCT 41.5  --   --    MCV 91  --   --      --   --        Outside studies reviewed:   ANTINUCLEAR ANTIBODY (ADRIANNA) Negative Negative [facility]     ADRIANNA TEST   Specimen   Blood - Blood     ADRIANNA TEST   Performing Organization Address City/[facility] Phone Number   [facility]   [address]   [place] [address]      Back to top of Lab Results       CYCLIC CITRULLINE PEPTIDE  CYCLIC CITRULLINE PEPTIDE   Component Value Ref Range Performed At   CCP Antibody,IgG/IgA 25.6 (H) <=19.9 CU [facility]     CYCLIC CITRULLINE PEPTIDE   Specimen   Blood - Blood     CYCLIC CITRULLINE PEPTIDE   Narrative Performed At   Negative <20    Positive >=20    The following results were obtained with the [employer] QUANTA Flash CCP3 chemiluminescent immunoassay. Values obtained with different manufacturers' assay methods may not be used interchangeably.       [facility]       CYCLIC CITRULLINE PEPTIDE   Performing Organization Address City/[facility] Phone Number   [facility]   [address]   [place] [address]      Back to top of Lab Results       LYME SCREEN W/REFLEX  LYME SCREEN W/REFLEX   Component Value Ref Range Performed At   LYME SCREEN W/REFLEX WEST BLOT NegativeComment: No detectable antibody; result does not exclude B. burgdorferi infection. An additional sample should be tested within 4-6 weeks if early infection is suspected. Negative [facility]     LYME SCREEN W/REFLEX   Specimen   Blood - Blood     LYME SCREEN W/REFLEX   Performing Organization Address City/[facility] Cent[facility]ode Phone Number   [facility]   [address]   [place] [address]      Back to top of Lab Results       RA  QUANTITATIVE  RA QUANTITATIVE   Component Value Ref Range Performed At   RHEUMATOID FACTOR,QUANT <15.9 <15.9 IU/mL [facility]     RA QUANTITATIVE   Specimen   Blood - Blood     RA QUANTITATIVE   Performing Organization Address City/State/Zipcode Phone Number   [facility]   [address]   [place] [address]      Back to top of Lab Results       SEDIMENTATION RATE  SEDIMENTATION RATE   Component Value Ref Range Performed At   SEDIMENTATION RATE  3 <21 mm/hr [facility]     SEDIMENTATION RATE   Specimen   Blood - Blood     SEDIMENTATION RATE   Performing Organization Address City/[facility]/Zipcode Phone Number   [facility]   [address] [place] [address] [phone]     Back to top of Lab Results       C-REACTIVE PROTEIN  C-REACTIVE PROTEIN   Component Value Ref Range Performed At   C-REACTIVE PROTEIN  0.06 <0.50 mg/dL [facility]     C-REACTIVE PROTEIN   Specimen   Blood - Blood     C-REACTIVE PROTEIN   Performing Organization Address City/[facility]ode Phone Number   [facility]   [address]           Clinical History: Finger sprain.        Procedure: XR FINGER 3 VIEWS RIGHT        Comparison: None.        Findings: No fracture or subluxation. There is normal joint spacing and alignment. No osseous lesion is identified. No soft tissue abnormality is evident.        Impression: Unremarkable right finger examination.     Reviewed Rheumatology lab flowsheet    Alejandro Menezes MD  [facility]  Department of Rheumatology & Autoimmune Disorders  [facility]: [phone]   Pager - [phone]

## 2018-12-26 LAB
ANA SER QL IF: NEGATIVE
CCP AB SER IA-ACNC: 1 U/ML
DSDNA AB SER-ACNC: <1 IU/ML
GAMMA INTERFERON BACKGROUND BLD IA-ACNC: 0.05 IU/ML
HBV CORE AB SERPL QL IA: NONREACTIVE
HBV SURFACE AG SERPL QL IA: NONREACTIVE
HCV AB SERPL QL IA: NONREACTIVE
M TB IFN-G BLD-IMP: NEGATIVE
M TB IFN-G CD4+ BCKGRND COR BLD-ACNC: >10 IU/ML
MITOGEN IGNF BCKGRD COR BLD-ACNC: 0 IU/ML
MITOGEN IGNF BCKGRD COR BLD-ACNC: 0.02 IU/ML

## 2019-01-09 NOTE — RESULT ENCOUNTER NOTE
Alfa Mchugh,     Your blood work has shown normal Rheumatoid arthritis tests, normal tests for autoimmune diseases like Lupus. Your white count is slightly low.     Normal results still does not rule out Rheumatoid arthritis as a diagnosis since your Anti CCP antibody was positive in the past. I recommend you to start Plaquenil, you can take 2 tab 400 mg on M/W/F and 200 mg on rest of the days. You can take 2 tab together or split into morning and evening doses.

## 2019-02-11 DIAGNOSIS — M06.09 SERONEGATIVE RHEUMATOID ARTHRITIS OF MULTIPLE SITES (H): ICD-10-CM

## 2019-02-11 RX ORDER — HYDROXYCHLOROQUINE SULFATE 200 MG/1
TABLET, FILM COATED ORAL
Qty: 120 TABLET | Refills: 0 | Status: SHIPPED | OUTPATIENT
Start: 2019-02-11 | End: 2019-04-01

## 2019-02-15 ENCOUNTER — HEALTH MAINTENANCE LETTER (OUTPATIENT)
Age: 44
End: 2019-02-15

## 2019-02-26 ENCOUNTER — OFFICE VISIT (OUTPATIENT)
Dept: RHEUMATOLOGY | Facility: CLINIC | Age: 44
End: 2019-02-26
Payer: COMMERCIAL

## 2019-02-26 VITALS
SYSTOLIC BLOOD PRESSURE: 95 MMHG | OXYGEN SATURATION: 98 % | DIASTOLIC BLOOD PRESSURE: 66 MMHG | BODY MASS INDEX: 23.73 KG/M2 | HEART RATE: 75 BPM | HEIGHT: 64 IN | WEIGHT: 139 LBS

## 2019-02-26 DIAGNOSIS — M06.00 RHEUMATOID ARTHRITIS WITH NEGATIVE RHEUMATOID FACTOR, INVOLVING UNSPECIFIED SITE (H): Primary | ICD-10-CM

## 2019-02-26 LAB
ALBUMIN SERPL-MCNC: 3.9 G/DL (ref 3.4–5)
ALT SERPL W P-5'-P-CCNC: 22 U/L (ref 0–50)
AST SERPL W P-5'-P-CCNC: 17 U/L (ref 0–45)
CREAT SERPL-MCNC: 0.87 MG/DL (ref 0.52–1.04)
CRP SERPL-MCNC: <2.9 MG/L (ref 0–8)
ERYTHROCYTE [DISTWIDTH] IN BLOOD BY AUTOMATED COUNT: 12 % (ref 10–15)
GFR SERPL CREATININE-BSD FRML MDRD: 81 ML/MIN/{1.73_M2}
HCT VFR BLD AUTO: 41.6 % (ref 35–47)
HGB BLD-MCNC: 13.6 G/DL (ref 11.7–15.7)
MCH RBC QN AUTO: 30 PG (ref 26.5–33)
MCHC RBC AUTO-ENTMCNC: 32.7 G/DL (ref 31.5–36.5)
MCV RBC AUTO: 92 FL (ref 78–100)
PLATELET # BLD AUTO: 187 10E9/L (ref 150–450)
RBC # BLD AUTO: 4.54 10E12/L (ref 3.8–5.2)
WBC # BLD AUTO: 3.1 10E9/L (ref 4–11)

## 2019-02-26 PROCEDURE — 85027 COMPLETE CBC AUTOMATED: CPT | Performed by: STUDENT IN AN ORGANIZED HEALTH CARE EDUCATION/TRAINING PROGRAM

## 2019-02-26 PROCEDURE — 86140 C-REACTIVE PROTEIN: CPT | Performed by: STUDENT IN AN ORGANIZED HEALTH CARE EDUCATION/TRAINING PROGRAM

## 2019-02-26 PROCEDURE — 82040 ASSAY OF SERUM ALBUMIN: CPT | Performed by: STUDENT IN AN ORGANIZED HEALTH CARE EDUCATION/TRAINING PROGRAM

## 2019-02-26 PROCEDURE — 99213 OFFICE O/P EST LOW 20 MIN: CPT | Performed by: STUDENT IN AN ORGANIZED HEALTH CARE EDUCATION/TRAINING PROGRAM

## 2019-02-26 PROCEDURE — 84460 ALANINE AMINO (ALT) (SGPT): CPT | Performed by: STUDENT IN AN ORGANIZED HEALTH CARE EDUCATION/TRAINING PROGRAM

## 2019-02-26 PROCEDURE — 84450 TRANSFERASE (AST) (SGOT): CPT | Performed by: STUDENT IN AN ORGANIZED HEALTH CARE EDUCATION/TRAINING PROGRAM

## 2019-02-26 PROCEDURE — 82565 ASSAY OF CREATININE: CPT | Performed by: STUDENT IN AN ORGANIZED HEALTH CARE EDUCATION/TRAINING PROGRAM

## 2019-02-26 PROCEDURE — 36415 COLL VENOUS BLD VENIPUNCTURE: CPT | Performed by: STUDENT IN AN ORGANIZED HEALTH CARE EDUCATION/TRAINING PROGRAM

## 2019-02-26 ASSESSMENT — MIFFLIN-ST. JEOR: SCORE: 1270.5

## 2019-02-26 ASSESSMENT — PAIN SCALES - GENERAL: PAINLEVEL: MILD PAIN (3)

## 2019-02-26 NOTE — NURSING NOTE
"Lolita Harris's goals for this visit include:   She requests these members of her care team be copied on today's visit information:     PCP: No Ref-Primary, Physician    Referring Provider:  No referring provider defined for this encounter.    BP 95/66   Pulse 75   Ht 1.626 m (5' 4\")   Wt 63 kg (139 lb)   SpO2 98%   BMI 23.86 kg/m     "

## 2019-02-26 NOTE — PROGRESS NOTES
Rheumatology Clinic Visit     Lolita Harris MRN# [id number]   YOB: 1975 Age: 43 year old     Date of Visit: February 26, 2019  Primary care provider: No Ref-Primary, Physician          Assessment and Plan:   Assessment     -- Seropositive Rheumatoid arthritis - + ACPA, - RF  -- Hx of positive ACPA  -- Leukopenia   -- Hypothyroidism  -- Neg ADRIANNA, VIVIAN, Normal C3/C4.     Ms Harris is 44 yo female seen in clinic for evaluation of joint pains.     Polyarthritis : She had pain in her joints mainly right index finger, wrists, knees, hips for 6 months along with stiffness. On first visit in 12/2018 she had tenderness over the right index finger PIP joint, right fifth PIP joint.  Mild tenderness present over the right wrist, right elbow.  Mild tenderness present over the left fifth PIP joint.  Tenderness present over bilateral ankles and MTP joints.  Normal range of motion of bilateral knees, hips and shoulders.    Her autoimmune work in 12/2018 showed normal RF, ACPA, ADRIANNA. But she had + ACPA in 9/2018. Her symptoms were consistent with seropositive Rheumatoid arthritis and was started on Plaquenil 300 mg daily dose in 1/2019.     She has noted improvement in her symptoms, denies any side effects with Plaquenil.     Will continue same dose of Plaquenil. Blood tests today      Plan    1.  Blood tests: CBC, AST/ALT,Cr, CRP.     2. Continue Plaquenil 400 mg on M/W/F and 200 mg on rest of the days.     3.  Return to clinic in 3 months          Active Problem List:     Patient Active Problem List    Diagnosis Date Noted     Inflammatory arthritis      Priority: Medium     Pelvic pain in female 12/28/2015     Priority: Medium            History of Present Illness:   Lolita Harris is a 43 year old female with PMH of hypothyroidism, Leukopenia seen in the clinic in consultation at request of Murali Smith for evaluation of joint pains.     February 26, 2019 - She has noted some improvement in her joints, they  still feel stiff and sore in the morning. Right hurt more espcially to open things. She is on Plaquenil since Jan 9th, 2019. Denies any side effects with Plaquenil. No new skin rash pleurisy, oral sores etc.     History from initial visit : She has swelling in her right index finger since summer 2018, others fingers are mildly stiff as well. Wrists are sore. Left hand is slightly sore. Mostly in the morning, better as the day goes on. Once in a while she has pain in her ankles. She has back pain and muscle tightness. Can not do exercises by moving her shoulders over her head. If she sit too long she has pain in her hips. She has knee pain for 3 - 4 years.     She has fatigue for years. C/o dry eyes and has inflammation in her gums due to lichen Planus. She has recurrent sinus issues, nose feels congested. She has photosensitivity and burn easily in the sun.      She has seen Dr Ledezma in Rheumatology in 2015 for evaluation of lupus in the setting of Leukopenia. Her ADRIANNA, ACPA, RF, ESR were negative at the time and no diagnosis of autoimmune connective tissue was made. She has seen hematologist for leukopenia in the past and had bone marrow biopsy which was normal.   No history of psoriasis, ulcerative colitis or chron's disease. No h/o iritis, enthesitis, finger or toe swelling like dactylitis, plantar fascitis or heel pain. Denies any raynauds, malar rash, photosensitivity, recurrent mouth/genital ulcers, sicca symptoms, pleuritic chest pains, swallowing difficulty, hearing or visual changes recently.  No h/o arterial/venous thrombosis in the past. Denies any tick bite, recent GI/ infection.             Review of Systems:   Review Of Systems  Constitutional: denies fever, chills, night sweats and weight loss.  Skin: No skin rash.  Eyes: + dryness or irritation in eyes. No episode of eye inflammation or redness.   Ears/Nose/Throat: no recurrent sinus infections.  Respiratory: No shortness of breath, dyspnea on  exertion, cough, or hemoptysis  Cardiovascular: no chest pain or palpitations.  Gastrointestinal: no nausea, vomiting, abdominal pain.  Normal bowel movements.  Genitourinary: no dysuria, frequency  or hematuria.  Musculoskeletal: as in HPI  Neurologic: no numbness, tingling.  Psychiatric: no mood disorders.  Hematologic/Lymphatic/Immunologic: no history of easy bruising, petechia or purpura.  No abnormal bleeding.   Endocrine: + h/o thyroid disease or Diabetes.                  Past Medical History:     Past Medical History:   Diagnosis Date     Abnormal glandular Papanicolaou smear of cervix      Endometriosis      Hypothyroid      Inflammatory arthritis      PONV (postoperative nausea and vomiting)      Past Surgical History:   Procedure Laterality Date      SECTION       LAPAROSCOPY OPERATIVE ADULT  95,97,2001 rso,10 darlyn     LAPAROSCOPY,VAG HYSTERECTOMY      lso     LASER CO2 LAPAROSCOPY DIAGNOSTIC, LYSIS ADHESIONS, COMBINED N/A 2016    Procedure: COMBINED LASER CO2 LAPAROSCOPY DIAGNOSTIC, LYSIS ADHESIONS;  Surgeon: Zay Archuleta MD;  Location: [facility]            Social History:     Social History     Occupational History     Not on file   Tobacco Use     Smoking status: Never Smoker     Smokeless tobacco: Never Used   Substance and Sexual Activity     Alcohol use: Yes     Alcohol/week: 0.0 oz     Comment: rarely     Drug use: No     Sexual activity: Yes     Partners: Male     Birth control/protection: Female Surgical     Comment: hysterectomy            Family History:     Family History   Problem Relation Age of Onset     Hyperlipidemia Father      Thyroid Disease Father             Allergies:     Allergies   Allergen Reactions     Penicillins Rash     Childhood rxn            Medications:     Current Outpatient Medications   Medication Sig Dispense Refill     Calcium-Magnesium-Zinc 333-133-5 MG TABS        fluticasone (FLONASE) 50 MCG/ACT nasal spray as needed  0     guaiFENesin (MUCINEX)  "600 MG 12 hr tablet        hydroxychloroquine (PLAQUENIL) 200 MG tablet 400 mg on M/W/F and 200 mg rest of the days. Annual Plaquenil toxicity eye screening required. 120 tablet 0     levothyroxine (SYNTHROID, LEVOTHROID) 75 MCG tablet Take 88 mcg by mouth daily        Omega-3 Fatty Acids (FISH OIL PO) Take by mouth daily       estradiol (ESTRACE) 1 MG tablet Take 1 tablet (1 mg) by mouth daily (Patient not taking: Reported on 12/24/2018) 90 tablet 1     predniSONE (DELTASONE) 5 MG tablet 4tab=20 mg qd x 5 days, 3tab=15 mg qd x 5 days, 2tab=10 mg qd x 5 days, 1 tab=5 mg qd x 5 days then stop. (Patient not taking: Reported on 2/26/2019.) 90 tablet 2            Physical Exam:   Blood pressure 95/66, pulse 75, height 1.626 m (5' 4\"), weight 63 kg (139 lb), SpO2 98 %, not currently breastfeeding.  Wt Readings from Last 4 Encounters:   02/26/19 63 kg (139 lb)   12/24/18 63 kg (139 lb)   04/17/17 61.7 kg (136 lb)   01/17/17 62.1 kg (136 lb 12.8 oz)       Constitutional: well-developed, appearing stated age; cooperative  Eyes: nl EOM, PERRLA, vision, conjunctiva, sclera  ENT: nl external ears, nose, hearing, lips, teeth, gums, throat  No mucous membrane lesions, normal saliva pool  Neck: no mass or thyroid enlargement  Resp: lungs clear to auscultation, nl to palpation  CV: RRR, no murmurs, rubs or gallops, no edema  GI: no ABD mass or tenderness, no HSM  : not tested  Lymph: no cervical, supraclavicular, inguinal or epitrochlear nodes    MS: All TMJ, neck, shoulder, elbow, wrist, MCP/PIP/DIP, spine, hip, knee, ankle, and foot MTP/IP joints were examined.     -- Tenderness over PIP joints, MCP joints resolved.  Mild tenderness present over the right elbow. Tenderness present over bilateral ankles and MTP joints.  Normal range of motion of bilateral knees, hips and shoulders.    -- No active synovitis or deformity. Full ROM.  Normal  strength.     -- No dactylitis,  tenosynovitis, enthesopathy.    Skin: no nail " pitting, alopecia, rash, nodules or lesions  Neuro: nl cranial nerves, strength, sensation, DTRs.   Psych: nl judgement, orientation, memory, affect.         Data:     Results for orders placed or performed in visit on 12/24/18   Erythrocyte sedimentation rate auto   Result Value Ref Range    Sed Rate 5 0 - 20 mm/h   CRP inflammation   Result Value Ref Range    CRP Inflammation <2.9 0.0 - 8.0 mg/L   Rheumatoid factor   Result Value Ref Range    Rheumatoid Factor <20 <20 IU/mL   Cyclic Citrullinated Peptide Antibody IgG   Result Value Ref Range    Cyclic Citrullinated Peptide Antibody, IgG 1 <7 U/mL   Anti Nuclear Makenzie IgG by IFA with Reflex   Result Value Ref Range    ADRIANNA interpretation Negative NEG^Negative   VIVIAN antibody panel   Result Value Ref Range    RNP Antibody IgG <0.2 0.0 - 0.9 AI    Tanner VIVIAN Antibody IgG <0.2 0.0 - 0.9 AI    SSA (Ro) (VIVIAN) Antibody, IgG <0.2 0.0 - 0.9 AI    SSB (La) (VIVIAN) Antibody, IgG <0.2 0.0 - 0.9 AI    Scleroderma Antibody Scl-70 VIVIAN IgG <0.2 0.0 - 0.9 AI   Complement C3   Result Value Ref Range    Complement C3 100 76 - 169 mg/dL   Complement C4   Result Value Ref Range    Complement C4 21 15 - 50 mg/dL   Hepatitis B core antibody   Result Value Ref Range    Hepatitis B Core Makenzie Nonreactive NR^Nonreactive   Hepatitis B surface antigen   Result Value Ref Range    Hep B Surface Agn Nonreactive NR^Nonreactive   Hepatitis C antibody   Result Value Ref Range    Hepatitis C Antibody Nonreactive NR^Nonreactive   AST   Result Value Ref Range    AST 27 0 - 45 U/L   ALT   Result Value Ref Range    ALT 31 0 - 50 U/L   Creatinine   Result Value Ref Range    Creatinine 0.77 0.52 - 1.04 mg/dL    GFR Estimate >90 >60 mL/min/[1.73_m2]    GFR Estimate If Black >90 >60 mL/min/[1.73_m2]   CBC with platelets differential   Result Value Ref Range    WBC 3.3 (L) 4.0 - 11.0 10e9/L    RBC Count 4.57 3.8 - 5.2 10e12/L    Hemoglobin 13.6 11.7 - 15.7 g/dL    Hematocrit 41.5 35.0 - 47.0 %    MCV 91 78 - 100 fl     MCH 29.8 26.5 - 33.0 pg    MCHC 32.8 31.5 - 36.5 g/dL    RDW 11.9 10.0 - 15.0 %    Platelet Count 199 150 - 450 10e9/L    Diff Method Automated Method     % Neutrophils 49.2 %    % Lymphocytes 41.1 %    % Monocytes 7.9 %    % Eosinophils 0.9 %    % Basophils 0.9 %    % Immature Granulocytes 0.0 %    Absolute Neutrophil 1.6 1.6 - 8.3 10e9/L    Absolute Lymphocytes 1.4 0.8 - 5.3 10e9/L    Absolute Monocytes 0.3 0.0 - 1.3 10e9/L    Absolute Eosinophils 0.0 0.0 - 0.7 10e9/L    Absolute Basophils 0.0 0.0 - 0.2 10e9/L    Abs Immature Granulocytes 0.0 0 - 0.4 10e9/L   DNA double stranded antibodies   Result Value Ref Range    DNA-ds <1 <10 IU/mL   Quantiferon TB Gold Plus   Result Value Ref Range    Quantiferon-TB Gold Plus Result Negative NEG^Negative    TB1 Ag minus Nil Value 0.02 IU/mL    TB2 Ag minus Nil Value 0.00 IU/mL    Mitogen minus Nil Result >10.00 IU/mL    Nil Result 0.05 IU/mL       Recent Labs   Lab Test 01/17/17  1402 12/19/16  1621   HGB 14.1 13.7      No results for input(s): TSH, T4 in the last 80458 hours.  Hemoglobin   Date Value Ref Range Status   12/24/2018 13.6 11.7 - 15.7 g/dL Final   01/17/2017 14.1 11.7 - 15.7 g/dL Final   12/19/2016 13.7 11.7 - 15.7 g/dL Final     Sed Rate   Date Value Ref Range Status   12/24/2018 5 0 - 20 mm/h Final     CRP Inflammation   Date Value Ref Range Status   12/24/2018 <2.9 0.0 - 8.0 mg/L Final     AST   Date Value Ref Range Status   12/24/2018 27 0 - 45 U/L Final     ALT   Date Value Ref Range Status   12/24/2018 31 0 - 50 U/L Final     Rheumatoid Factor   Date Value Ref Range Status   12/24/2018 <20 <20 IU/mL Final     Recent Labs   Lab Test 12/24/18  0857 01/17/17  1402 12/19/16  1621   WBC 3.3*  --   --    HGB 13.6 14.1 13.7   HCT 41.5  --   --    MCV 91  --   --      --   --    AST 27  --   --    ALT 31  --   --        Outside studies reviewed:   ANTINUCLEAR ANTIBODY (ADRIANNA) Negative Negative [facility] LABORATORY-CENTRAL LABORATORY     ADRIANNA TEST    Specimen   Blood - Blood     ADRIANNA TEST   Performing Organization Address City/[facility]/Zipcode Phone Number   [facility]   [address]   [place] [address]      Back to top of Lab Results       CYCLIC CITRULLINE PEPTIDE  CYCLIC CITRULLINE PEPTIDE   Component Value Ref Range Performed At   CCP Antibody,IgG/IgA 25.6 (H) <=19.9 [facility]     CYCLIC CITRULLINE PEPTIDE   Specimen   Blood - Blood     CYCLIC CITRULLINE PEPTIDE   Narrative Performed At   Negative <20    Positive >=20    The following results were obtained with the [employer] QUANTA Flash CCP3 chemiluminescent immunoassay. Values obtained with different manufacturers' assay methods may not be used interchangeably.       [facility] LABORATORY       CYCLIC CITRULLINE PEPTIDE   Performing Organization Address City/[facility] Number   [facility]   [address]   [place] [address]      Back to top of Lab Results       LYME SCREEN W/REFLEX  LYME SCREEN W/REFLEX   Component Value Ref Range Performed At   LYME SCREEN W/REFLEX WEST BLOT NegativeComment: No detectable antibody; result does not exclude B. burgdorferi infection. An additional sample should be tested within 4-6 weeks if early infection is suspected. Negative [facility] LABORATORY     LYME SCREEN W/REFLEX   Specimen   Blood - Blood     LYME SCREEN W/REFLEX   Performing Organization Address City/[facility]/Zipcode Phone Number   [facility]   [address]   [place] [address]      Back to top of Lab Results       RA QUANTITATIVE  RA QUANTITATIVE   Component Value Ref Range Performed At   RHEUMATOID FACTOR,QUANT <15.9 <15.9 IU/mL [facility] LABORATORY     RA QUANTITATIVE   Specimen   Blood - Blood     RA QUANTITATIVE   Performing Organization Address  City/State/Zipcode Phone Number   [facility] LABORATORY-CENTRAL LABORATORY   [address]   [place] [address]      Back to top of Lab Results       SEDIMENTATION RATE  SEDIMENTATION RATE   Component Value Ref Range Performed At   SEDIMENTATION RATE  3 <21 mm/hr [facility]     SEDIMENTATION RATE   Specimen   Blood - Blood     SEDIMENTATION RATE   Performing Organization Address City/State/Zipcode Phone Number   [facility]   [address] [place] [address] [phone]     Back to top of Lab Results       C-REACTIVE PROTEIN  C-REACTIVE PROTEIN   Component Value Ref Range Performed At   C-REACTIVE PROTEIN  0.06 <0.50 mg/dL [facility] LABORATORY-CENTRAL LABORATORY     C-REACTIVE PROTEIN   Specimen   Blood - Blood     C-REACTIVE PROTEIN   Performing Organization Address City/[facility]/Zipcode Phone Number   [facility] LABORATORY-CENTRAL LABORATORY   [address]           Clinical History: Finger sprain.        Procedure: XR FINGER 3 VIEWS RIGHT        Comparison: None.        Findings: No fracture or subluxation. There is normal joint spacing and alignment. No osseous lesion is identified. No soft tissue abnormality is evident.        Impression: Unremarkable right finger examination.     Reviewed Rheumatology lab flowsheet    Alejandro Menezes MD  [facility]  Department of Rheumatology & Autoimmune Disorders  [facility]: [phone]   Pager - [phone]

## 2019-03-14 ENCOUNTER — TRANSFERRED RECORDS (OUTPATIENT)
Dept: HEALTH INFORMATION MANAGEMENT | Facility: CLINIC | Age: 44
End: 2019-03-14

## 2019-03-19 ENCOUNTER — DOCUMENTATION ONLY (OUTPATIENT)
Dept: RHEUMATOLOGY | Facility: CLINIC | Age: 44
End: 2019-03-19

## 2019-04-01 DIAGNOSIS — M06.09 SERONEGATIVE RHEUMATOID ARTHRITIS OF MULTIPLE SITES (H): ICD-10-CM

## 2019-04-01 RX ORDER — HYDROXYCHLOROQUINE SULFATE 200 MG/1
TABLET, FILM COATED ORAL
Qty: 120 TABLET | Refills: 1 | Status: SHIPPED | OUTPATIENT
Start: 2019-04-01 | End: 2019-07-29

## 2019-04-22 ENCOUNTER — MYC MEDICAL ADVICE (OUTPATIENT)
Dept: RHEUMATOLOGY | Facility: CLINIC | Age: 44
End: 2019-04-22

## 2019-05-28 ENCOUNTER — OFFICE VISIT (OUTPATIENT)
Dept: RHEUMATOLOGY | Facility: CLINIC | Age: 44
End: 2019-05-28
Payer: COMMERCIAL

## 2019-05-28 VITALS
WEIGHT: 138.8 LBS | SYSTOLIC BLOOD PRESSURE: 95 MMHG | DIASTOLIC BLOOD PRESSURE: 72 MMHG | BODY MASS INDEX: 23.7 KG/M2 | OXYGEN SATURATION: 97 % | HEIGHT: 64 IN | HEART RATE: 82 BPM

## 2019-05-28 DIAGNOSIS — N39.0 RECURRENT UTI: ICD-10-CM

## 2019-05-28 DIAGNOSIS — Z79.899 HIGH RISK MEDICATION USE: ICD-10-CM

## 2019-05-28 DIAGNOSIS — M06.9 RHEUMATOID ARTHRITIS INVOLVING MULTIPLE SITES, UNSPECIFIED RHEUMATOID FACTOR PRESENCE: Primary | ICD-10-CM

## 2019-05-28 PROCEDURE — 99214 OFFICE O/P EST MOD 30 MIN: CPT | Performed by: STUDENT IN AN ORGANIZED HEALTH CARE EDUCATION/TRAINING PROGRAM

## 2019-05-28 RX ORDER — NITROFURANTOIN 25; 75 MG/1; MG/1
100 CAPSULE ORAL 2 TIMES DAILY PRN
COMMUNITY

## 2019-05-28 RX ORDER — FOLIC ACID 1 MG/1
1 TABLET ORAL DAILY
Qty: 90 TABLET | Refills: 3 | Status: SHIPPED | OUTPATIENT
Start: 2019-05-28 | End: 2019-08-28

## 2019-05-28 ASSESSMENT — MIFFLIN-ST. JEOR: SCORE: 1264.59

## 2019-05-28 ASSESSMENT — ROUTINE ASSESSMENT OF PATIENT INDEX DATA (RAPID3)
RAPID3 INTERPRETATION: MODERATE 6.1-12.0
TOTAL RAPID3 SCORE: 7.7

## 2019-05-28 ASSESSMENT — PAIN SCALES - GENERAL: PAINLEVEL: MILD PAIN (3)

## 2019-05-28 NOTE — NURSING NOTE
"Lolita Harris's goals for this visit include:   She requests these members of her care team be copied on today's visit information:     PCP: No Ref-Primary, Physician    Referring Provider:  No referring provider defined for this encounter.    BP 95/72   Pulse 82   Ht 1.626 m (5' 4\")   Wt 63 kg (138 lb 12.8 oz)   SpO2 97%   BMI 23.82 kg/m     "

## 2019-05-28 NOTE — PATIENT INSTRUCTIONS
Patient Education     Methotrexate tablets  Brand Names: Rheumatrex, Trexall  What is this medicine?  METHOTREXATE (METH oh TREX ate) is a chemotherapy drug used to treat cancer including breast cancer, leukemia, and lymphoma. This medicine can also be used to treat psoriasis and certain kinds of arthritis.  How should I use this medicine?  Take this medicine by mouth with a glass of water. Follow the directions on the prescription label. Take your medicine at regular intervals. Do not take it more often than directed. Do not stop taking except on your doctor's advice.  Make sure you know why you are taking this medicine and how often you should take it. If this medicine is used for a condition that is not cancer, like arthritis or psoriasis, it should be taken weekly, NOT daily. Taking this medicine more often than directed can cause serious side effects, even death.  Talk to your healthcare provider about safe handling and disposal of this medicine. You may need to take special precautions.  Talk to your pediatrician regarding the use of this medicine in children. While this drug may be prescribed for selected conditions, precautions do apply.  What side effects may I notice from receiving this medicine?  Side effects that you should report to your doctor or health care professional as soon as possible:    allergic reactions like skin rash, itching or hives, swelling of the face, lips, or tongue    breathing problems or shortness of breath    diarrhea    dry, nonproductive cough    low blood counts - this medicine may decrease the number of white blood cells, red blood cells and platelets. You may be at increased risk for infections and bleeding.    mouth sores    redness, blistering, peeling or loosening of the skin, including inside the mouth    signs of infection - fever or chills, cough, sore throat, pain or trouble passing urine    signs and symptoms of bleeding such as bloody or black, tarry stools; red or  dark-brown urine; spitting up blood or brown material that looks like coffee grounds; red spots on the skin; unusual bruising or bleeding from the eye, gums, or nose    signs and symptoms of kidney injury like trouble passing urine or change in the amount of urine    signs and symptoms of liver injury like dark yellow or brown urine; general ill feeling or flu-like symptoms; light-colored stools; loss of appetite; nausea; right upper belly pain; unusually weak or tired; yellowing of the eyes or skin  Side effects that usually do not require medical attention (report to your doctor or health care professional if they continue or are bothersome):    dizziness    hair loss    tiredness    upset stomach    vomiting  What may interact with this medicine?  This medicine may interact with the following medication:    acitretin    aspirin and aspirin-like medicines including salicylates    azathioprine    certain antibiotics like penicillins, tetracycline, and chloramphenicol    cyclosporine    gold    hydroxychloroquine    live virus vaccines    NSAIDs, medicines for pain and inflammation, like ibuprofen or naproxen    other cytotoxic agents    penicillamine    phenylbutazone    phenytoin    probenecid    retinoids such as isotretinoin and tretinoin    steroid medicines like prednisone or cortisone    sulfonamides like sulfasalazine and trimethoprim/sulfamethoxazole    theophylline  What if I miss a dose?  If you miss a dose, talk with your doctor or health care professional. Do not take double or extra doses.  Where should I keep my medicine?  Keep out of the reach of children.  Store at room temperature between 20 and 25 degrees C (68 and 77 degrees F). Protect from light. Throw away any unused medicine after the expiration date.  What should I tell my health care provider before I take this medicine?  They need to know if you have any of these conditions:    fluid in the stomach area or lungs    if you often drink  alcohol    infection or immune system problems    kidney disease or on hemodialysis    liver disease    low blood counts, like low white cell, platelet, or red cell counts    lung disease    radiation therapy    stomach ulcers    ulcerative colitis    an unusual or allergic reaction to methotrexate, other medicines, foods, dyes, or preservatives    pregnant or trying to get pregnant    breast-feeding  What should I watch for while using this medicine?  Avoid alcoholic drinks.  This medicine can make you more sensitive to the sun. Keep out of the sun. If you cannot avoid being in the sun, wear protective clothing and use sunscreen. Do not use sun lamps or tanning beds/booths.  You may need blood work done while you are taking this medicine.  Call your doctor or health care professional for advice if you get a fever, chills or sore throat, or other symptoms of a cold or flu. Do not treat yourself. This drug decreases your body's ability to fight infections. Try to avoid being around people who are sick.  This medicine may increase your risk to bruise or bleed. Call your doctor or health care professional if you notice any unusual bleeding.  Check with your doctor or health care professional if you get an attack of severe diarrhea, nausea and vomiting, or if you sweat a lot. The loss of too much body fluid can make it dangerous for you to take this medicine.  Talk to your doctor about your risk of cancer. You may be more at risk for certain types of cancers if you take this medicine.  Both men and women must use effective birth control with this medicine. Do not become pregnant while taking this medicine or until at least 1 normal menstrual cycle has occurred after stopping it. Women should inform their doctor if they wish to become pregnant or think they might be pregnant. Men should not father a child while taking this medicine and for 3 months after stopping it. There is a potential for serious side effects to an  unborn child. Talk to your health care professional or pharmacist for more information. Do not breast-feed an infant while taking this medicine.  NOTE:This sheet is a summary. It may not cover all possible information. If you have questions about this medicine, talk to your doctor, pharmacist, or health care provider. Copyright  2018 [employer]

## 2019-05-28 NOTE — RESULT ENCOUNTER NOTE
Results discussed with the patient at the time of visit.     Alejandro Menezes MD   5/28/2019  8:24 AM

## 2019-05-28 NOTE — PROGRESS NOTES
Rheumatology Clinic Visit     Lolita Harris MRN# [id number]   YOB: 1975 Age: 43 year old     Date of Visit: May 28, 2019  Primary care provider: No Ref-Primary, Physician          Assessment and Plan:   Assessment     -- Seropositive Rheumatoid arthritis - + ACPA, - RF  -- Hx of positive ACPA - 25.6 - 9/2018  -- Leukopenia   -- Hypothyroidism  -- Neg ADRIANNA, VIVIAN, Normal C3/C4.     Ms Harris is 43 yo female seen in clinic for evaluation of joint pains.     Polyarthritis : She had pain in her joints mainly right index finger, wrists, knees, hips for 6 months along with stiffness. On first visit in 12/2018 she had tenderness over the right index finger PIP joint, right fifth PIP joint.  Mild tenderness present over the right wrist, right elbow.  Mild tenderness present over the left fifth PIP joint.  Tenderness present over bilateral ankles and MTP joints.  Normal range of motion of bilateral knees, hips and shoulders.    Her autoimmune work in 12/2018 showed normal RF, ACPA, ADRIANNA. But she had + ACPA in 9/2018. Her symptoms were consistent with seropositive Rheumatoid arthritis and was started on Plaquenil 300 mg daily dose in 1/2019.     She has noted improvement in her symptoms mainly swelling in her hands but still c/o achiness, denies any side effects with Plaquenil. I will add Methotrexate to Plaquenil. The risks were discussed including fatigue, nausea, oral ulcers, increased risk of infection, pulmonary fibrosis, pancytopenia and elevated LFTs. She was informed that folic acid could help to minimizes side effects. Was informed to avoid drinking ETOH since it could cause hepatotoxicity. Informed her that MTX is teratogenic and she is not allowed to become pregnant on this drug. MTX is slow acting, it takes 4-6 weeks to show benefit.    Recurrent UTI : She reports that since January 2019 she has been having recurrent UTI.  Seen by her urologist who thinks it is hormonal related to intercourse.  She is  on Macrobid right now for the next 10 days for UTI.  She will start methotrexate after finishing antibiotic treatment.  I will check her immunoglobulin levels with the next blood draw to make sure recurrent UTI is not related to low immunoglobulin levels.    High risk medication use: Monitoring labs done in February 2019 showed normal AST, ALT, creatinine, white count of 3.1 otherwise normal CBC.  She had eye exam done for Plaquenil toxicity in March 2018.  We will get those records.    Plan    1.  Blood tests: CBC, AST/ALT,Cr, CRP in a month after starting Methotrexate.     2. Start Methotrexate, 4 tab once a week and increase by 1 tab every week to reach 6 tab once a week dose.     3. Continue Plaquenil 400 mg on M/W/F and 200 mg on rest of the days.     4.  Return to clinic in 3 months          Active Problem List:     Patient Active Problem List    Diagnosis Date Noted     Inflammatory arthritis      Priority: Medium     Pelvic pain in female 12/28/2015     Priority: Medium            History of Present Illness:   Lolita Harris is a 43 year old female with PMH of hypothyroidism, Leukopenia seen in the clinic in consultation at request of Murali Smith for evaluation of joint pains.     May 28, 2019 - She still has achiness in her hands, exercise is bothersome. Achiness is in her knees. In the past it was more in the winter time but she has pain even now in this weather. If she go on for a walk her hips gets sore. Stiffness in the morning in hips, feet, ankles for about couple hours. She has recurrent UTI's since January 2019, She has seen Urologist who thinks it can be related to hormonal imbalance and intercourse.     February 26, 2019 - She has noted some improvement in her joints, they still feel stiff and sore in the morning. Right hurt more espcially to open things. She is on Plaquenil since Jan 9th, 2019. Denies any side effects with Plaquenil. No new skin rash pleurisy, oral sores etc.     History  from initial visit : She has swelling in her right index finger since summer 2018, others fingers are mildly stiff as well. Wrists are sore. Left hand is slightly sore. Mostly in the morning, better as the day goes on. Once in a while she has pain in her ankles. She has back pain and muscle tightness. Can not do exercises by moving her shoulders over her head. If she sit too long she has pain in her hips. She has knee pain for 3 - 4 years.     She has fatigue for years. C/o dry eyes and has inflammation in her gums due to lichen Planus. She has recurrent sinus issues, nose feels congested. She has photosensitivity and burn easily in the sun.      She has seen Dr Ledezma in Rheumatology in 2015 for evaluation of lupus in the setting of Leukopenia. Her ADRIANNA, ACPA, RF, ESR were negative at the time and no diagnosis of autoimmune connective tissue was made. She has seen hematologist for leukopenia in the past and had bone marrow biopsy which was normal.   No history of psoriasis, ulcerative colitis or chron's disease. No h/o iritis, enthesitis, finger or toe swelling like dactylitis, plantar fascitis or heel pain. Denies any raynauds, malar rash, photosensitivity, recurrent mouth/genital ulcers, sicca symptoms, pleuritic chest pains, swallowing difficulty, hearing or visual changes recently.  No h/o arterial/venous thrombosis in the past. Denies any tick bite, recent GI/ infection.             Review of Systems:   Review Of Systems  Constitutional: denies fever, chills, night sweats and weight loss.  Skin: No skin rash.  Eyes: + dryness or irritation in eyes. No episode of eye inflammation or redness.   Ears/Nose/Throat: no recurrent sinus infections.  Respiratory: No shortness of breath, dyspnea on exertion, cough, or hemoptysis  Cardiovascular: no chest pain or palpitations.  Gastrointestinal: no nausea, vomiting, abdominal pain.  Normal bowel movements.  Genitourinary: no dysuria, frequency  or  hematuria.  Musculoskeletal: as in HPI  Neurologic: no numbness, tingling.  Psychiatric: no mood disorders.  Hematologic/Lymphatic/Immunologic: no history of easy bruising, petechia or purpura.  No abnormal bleeding.   Endocrine: + h/o thyroid disease or Diabetes.                  Past Medical History:     Past Medical History:   Diagnosis Date     Abnormal glandular Papanicolaou smear of cervix      Endometriosis      Hypothyroid      Inflammatory arthritis      PONV (postoperative nausea and vomiting)      Past Surgical History:   Procedure Laterality Date      SECTION       LAPAROSCOPY OPERATIVE ADULT  95,97,2001 rso,10 darlyn     LAPAROSCOPY,VAG HYSTERECTOMY      lso     LASER CO2 LAPAROSCOPY DIAGNOSTIC, LYSIS ADHESIONS, COMBINED N/A 2016    Procedure: COMBINED LASER CO2 LAPAROSCOPY DIAGNOSTIC, LYSIS ADHESIONS;  Surgeon: Zay Archuleta MD;  Location: [facility]            Social History:     Social History     Occupational History     Not on file   Tobacco Use     Smoking status: Never Smoker     Smokeless tobacco: Never Used   Substance and Sexual Activity     Alcohol use: Yes     Alcohol/week: 0.0 oz     Comment: rarely     Drug use: No     Sexual activity: Yes     Partners: Male     Birth control/protection: Female Surgical     Comment: hysterectomy            Family History:     Family History   Problem Relation Age of Onset     Hyperlipidemia Father      Thyroid Disease Father             Allergies:     Allergies   Allergen Reactions     Penicillins Rash     Childhood rxn            Medications:     Current Outpatient Medications   Medication Sig Dispense Refill     Calcium-Magnesium-Zinc 333-133-5 MG TABS        fluticasone (FLONASE) 50 MCG/ACT nasal spray as needed  0     guaiFENesin (MUCINEX) 600 MG 12 hr tablet        hydroxychloroquine (PLAQUENIL) 200 MG tablet TAKE 2 TABLETS (400MG) BY  MOUTH ON MONDAY, WEDNESDAY, AND FRIDAY. TAKE 1 TABLET  (200MG) THE REST OF THE  OTHER DAYS 120 tablet 1  "    levothyroxine (SYNTHROID, LEVOTHROID) 75 MCG tablet Take 88 mcg by mouth daily        nitroFURantoin macrocrystal-monohydrate (MACROBID) 100 MG capsule Take 100 mg by mouth 2 times daily       Omega-3 Fatty Acids (FISH OIL PO) Take by mouth daily       predniSONE (DELTASONE) 5 MG tablet 4tab=20 mg qd x 5 days, 3tab=15 mg qd x 5 days, 2tab=10 mg qd x 5 days, 1 tab=5 mg qd x 5 days then stop. 90 tablet 2     estradiol (ESTRACE) 1 MG tablet Take 1 tablet (1 mg) by mouth daily (Patient not taking: Reported on 12/24/2018) 90 tablet 1            Physical Exam:   Blood pressure 95/72, pulse 82, height 1.626 m (5' 4\"), weight 63 kg (138 lb 12.8 oz), SpO2 97 %, not currently breastfeeding.  Wt Readings from Last 4 Encounters:   05/28/19 63 kg (138 lb 12.8 oz)   02/26/19 63 kg (139 lb)   12/24/18 63 kg (139 lb)   04/17/17 61.7 kg (136 lb)       Constitutional: well-developed, appearing stated age; cooperative  Eyes: nl EOM, PERRLA, vision, conjunctiva, sclera  ENT: nl external ears, nose, hearing, lips, teeth, gums, throat  No mucous membrane lesions, normal saliva pool  Neck: no mass or thyroid enlargement  Resp: lungs clear to auscultation, nl to palpation  CV: RRR, no murmurs, rubs or gallops, no edema  GI: no ABD mass or tenderness, no HSM  : not tested  Lymph: no cervical, supraclavicular, inguinal or epitrochlear nodes    MS: All TMJ, neck, shoulder, elbow, wrist, MCP/PIP/DIP, spine, hip, knee, ankle, and foot MTP/IP joints were examined.     -- Mild Tenderness over PIP joints, MCP joints no synovitis.  Mild tenderness present over elbows. Tenderness present over bilateral ankles and MTP joints.  Normal range of motion of bilateral knees, hips and shoulders.    -- No dactylitis,  tenosynovitis, enthesopathy.    Skin: no nail pitting, alopecia, rash, nodules or lesions  Neuro: nl cranial nerves, strength, sensation, DTRs.   Psych: nl judgement, orientation, memory, affect.         Data:     Results for orders " placed or performed in visit on 02/26/19   CBC with platelets   Result Value Ref Range    WBC 3.1 (L) 4.0 - 11.0 10e9/L    RBC Count 4.54 3.8 - 5.2 10e12/L    Hemoglobin 13.6 11.7 - 15.7 g/dL    Hematocrit 41.6 35.0 - 47.0 %    MCV 92 78 - 100 fl    MCH 30.0 26.5 - 33.0 pg    MCHC 32.7 31.5 - 36.5 g/dL    RDW 12.0 10.0 - 15.0 %    Platelet Count 187 150 - 450 10e9/L   AST   Result Value Ref Range    AST 17 0 - 45 U/L   ALT   Result Value Ref Range    ALT 22 0 - 50 U/L   Albumin level   Result Value Ref Range    Albumin 3.9 3.4 - 5.0 g/dL   Creatinine   Result Value Ref Range    Creatinine 0.87 0.52 - 1.04 mg/dL    GFR Estimate 81 >60 mL/min/[1.73_m2]    GFR Estimate If Black >90 >60 mL/min/[1.73_m2]   CRP inflammation   Result Value Ref Range    CRP Inflammation <2.9 0.0 - 8.0 mg/L       Recent Labs   Lab Test 01/17/17  1402 12/19/16  1621   HGB 14.1 13.7      No results for input(s): TSH, T4 in the last 60909 hours.  Hemoglobin   Date Value Ref Range Status   02/26/2019 13.6 11.7 - 15.7 g/dL Final   12/24/2018 13.6 11.7 - 15.7 g/dL Final   01/17/2017 14.1 11.7 - 15.7 g/dL Final     Sed Rate   Date Value Ref Range Status   12/24/2018 5 0 - 20 mm/h Final     CRP Inflammation   Date Value Ref Range Status   02/26/2019 <2.9 0.0 - 8.0 mg/L Final   12/24/2018 <2.9 0.0 - 8.0 mg/L Final     AST   Date Value Ref Range Status   02/26/2019 17 0 - 45 U/L Final   12/24/2018 27 0 - 45 U/L Final     Albumin   Date Value Ref Range Status   02/26/2019 3.9 3.4 - 5.0 g/dL Final     ALT   Date Value Ref Range Status   02/26/2019 22 0 - 50 U/L Final   12/24/2018 31 0 - 50 U/L Final     Rheumatoid Factor   Date Value Ref Range Status   12/24/2018 <20 <20 IU/mL Final     Recent Labs   Lab Test 02/26/19  0918 12/24/18  0857 01/17/17  1402   WBC 3.1* 3.3*  --    HGB 13.6 13.6 14.1   HCT 41.6 41.5  --    MCV 92 91  --     199  --    AST 17 27  --    ALT 22 31  --        Outside studies reviewed:   ANTINUCLEAR ANTIBODY (ADRIANNA) Negative  Negative [facility]     ADRIANNA TEST   Specimen   Blood - Blood     ADRIANNA TEST   Performing Organization Address City/[facility] Services[facility] Phone Number   [facility]   [address] [employer] [address]   [place] [address]      Back to top of Lab Results       CYCLIC CITRULLINE PEPTIDE  CYCLIC CITRULLINE PEPTIDE   Component Value Ref Range Performed At   CCP Antibody,IgG/IgA 25.6 (H) <=19.9 [facility]     CYCLIC CITRULLINE PEPTIDE   Specimen   Blood - Blood     CYCLIC CITRULLINE PEPTIDE   Narrative Performed At   Negative <20    Positive >=20    The following results were obtained with the [employer] QUANTA Flash CCP3 chemiluminescent immunoassay. Values obtained with different manufacturers' assay methods may not be used interchangeably.       [facility]       CYCLIC CITRULLINE PEPTIDE   Performing Organization Address City/[facility] Services[facility] Phone Number   [facility]   [address] [employer]   [place] [address]      Back to top of Lab Results       LYME SCREEN W/REFLEX  LYME SCREEN W/REFLEX   Component Value Ref Range Performed At   LYME SCREEN W/REFLEX WEST BLOT NegativeComment: No detectable antibody; result does not exclude B. burgdorferi infection. An additional sample should be tested within 4-6 weeks if early infection is suspected. Negative [facility]     LYME SCREEN W/REFLEX   Specimen   Blood - Blood     LYME SCREEN W/REFLEX   Performing Organization Address City/[facility] Services[facility] Phone Number   [facility]   [address] [employer]   [place] [address]      Back to top of Lab Results       RA QUANTITATIVE  RA QUANTITATIVE   Component Value Ref Range Performed At   RHEUMATOID FACTOR,QUANT <15.9 <15.9 IU/mL [facility]     RA QUANTITATIVE   Specimen   Blood -  Blood     RA QUANTITATIVE   Performing Organization Address City/State/Zipcode Phone Number   [facility] LABORATORY-CENTRAL LAB[facility]ATORY   [address]   [place] [address]      Back to top of Lab Results       SEDIMENTATION RATE  SEDIMENTATION RATE   Component Value Ref Range Performed At   SEDIMENTATION RATE  3 <21 mm/hr [facility]     SEDIMENTATION RATE   Specimen   Blood - Blood     SEDIMENTATION RATE   Performing Organization Address City/[facility]/Zipcode Phone Number   [facility]   [address] [place] [address] [phone]     Back to top of Lab Results       C-REACTIVE PROTEIN  C-REACTIVE PROTEIN   Component Value Ref Range Performed At   C-REACTIVE PROTEIN  0.06 <0.50 mg/dL [facility]     C-REACTIVE PROTEIN   Specimen   Blood - Blood     C-REACTIVE PROTEIN   Performing Organization Address City/[facility] Phone Number   [facility] LABORATORY-CENTRAL LABORATORY   [address]           Clinical History: Finger sprain.        Procedure: XR FINGER 3 VIEWS RIGHT        Comparison: None.        Findings: No fracture or subluxation. There is normal joint spacing and alignment. No osseous lesion is identified. No soft tissue abnormality is evident.        Impression: Unremarkable right finger examination.     Reviewed Rheumatology lab flowsheet    Alejandro Menezes MD  [facility]  Department of Rheumatology & Autoimmune Disorders  [facility]: [phone]   Pager - [phone]

## 2019-06-28 DIAGNOSIS — M06.9 RHEUMATOID ARTHRITIS INVOLVING MULTIPLE SITES, UNSPECIFIED RHEUMATOID FACTOR PRESENCE: ICD-10-CM

## 2019-06-28 DIAGNOSIS — Z79.899 HIGH RISK MEDICATION USE: ICD-10-CM

## 2019-06-28 DIAGNOSIS — N39.0 RECURRENT UTI: ICD-10-CM

## 2019-06-28 LAB
ALBUMIN SERPL-MCNC: 4 G/DL (ref 3.4–5)
ALT SERPL W P-5'-P-CCNC: 20 U/L (ref 0–50)
AST SERPL W P-5'-P-CCNC: 18 U/L (ref 0–45)
CREAT SERPL-MCNC: 0.87 MG/DL (ref 0.52–1.04)
CRP SERPL-MCNC: <2.9 MG/L (ref 0–8)
ERYTHROCYTE [DISTWIDTH] IN BLOOD BY AUTOMATED COUNT: 12.3 % (ref 10–15)
ERYTHROCYTE [SEDIMENTATION RATE] IN BLOOD BY WESTERGREN METHOD: 4 MM/H (ref 0–20)
GFR SERPL CREATININE-BSD FRML MDRD: 81 ML/MIN/{1.73_M2}
HCT VFR BLD AUTO: 41.8 % (ref 35–47)
HGB BLD-MCNC: 13.7 G/DL (ref 11.7–15.7)
MCH RBC QN AUTO: 30.3 PG (ref 26.5–33)
MCHC RBC AUTO-ENTMCNC: 32.8 G/DL (ref 31.5–36.5)
MCV RBC AUTO: 93 FL (ref 78–100)
PLATELET # BLD AUTO: 211 10E9/L (ref 150–450)
RBC # BLD AUTO: 4.52 10E12/L (ref 3.8–5.2)
WBC # BLD AUTO: 3.5 10E9/L (ref 4–11)

## 2019-06-28 PROCEDURE — 36415 COLL VENOUS BLD VENIPUNCTURE: CPT | Performed by: STUDENT IN AN ORGANIZED HEALTH CARE EDUCATION/TRAINING PROGRAM

## 2019-06-28 PROCEDURE — 82784 ASSAY IGA/IGD/IGG/IGM EACH: CPT | Performed by: STUDENT IN AN ORGANIZED HEALTH CARE EDUCATION/TRAINING PROGRAM

## 2019-06-28 PROCEDURE — 85652 RBC SED RATE AUTOMATED: CPT | Performed by: STUDENT IN AN ORGANIZED HEALTH CARE EDUCATION/TRAINING PROGRAM

## 2019-06-28 PROCEDURE — 82565 ASSAY OF CREATININE: CPT | Performed by: STUDENT IN AN ORGANIZED HEALTH CARE EDUCATION/TRAINING PROGRAM

## 2019-06-28 PROCEDURE — 84450 TRANSFERASE (AST) (SGOT): CPT | Performed by: STUDENT IN AN ORGANIZED HEALTH CARE EDUCATION/TRAINING PROGRAM

## 2019-06-28 PROCEDURE — 84460 ALANINE AMINO (ALT) (SGPT): CPT | Performed by: STUDENT IN AN ORGANIZED HEALTH CARE EDUCATION/TRAINING PROGRAM

## 2019-06-28 PROCEDURE — 86140 C-REACTIVE PROTEIN: CPT | Performed by: STUDENT IN AN ORGANIZED HEALTH CARE EDUCATION/TRAINING PROGRAM

## 2019-06-28 PROCEDURE — 82040 ASSAY OF SERUM ALBUMIN: CPT | Performed by: STUDENT IN AN ORGANIZED HEALTH CARE EDUCATION/TRAINING PROGRAM

## 2019-06-28 PROCEDURE — 85027 COMPLETE CBC AUTOMATED: CPT | Performed by: STUDENT IN AN ORGANIZED HEALTH CARE EDUCATION/TRAINING PROGRAM

## 2019-07-01 LAB
IGA SERPL-MCNC: 184 MG/DL (ref 70–380)
IGG SERPL-MCNC: 831 MG/DL (ref 695–1620)
IGM SERPL-MCNC: 61 MG/DL (ref 60–265)

## 2019-07-29 DIAGNOSIS — M06.09 SERONEGATIVE RHEUMATOID ARTHRITIS OF MULTIPLE SITES (H): ICD-10-CM

## 2019-07-29 RX ORDER — HYDROXYCHLOROQUINE SULFATE 200 MG/1
TABLET, FILM COATED ORAL
Qty: 130 TABLET | Refills: 1 | Status: SHIPPED | OUTPATIENT
Start: 2019-07-29 | End: 2019-08-28

## 2019-07-29 NOTE — TELEPHONE ENCOUNTER
Medication/Dose:     hydroxychloroquine (PLAQUENIL) 200 MG tablet 120 tablet 1 4/1/2019  No   Sig: TAKE 2 TABLETS (400MG) BY  MOUTH ON MONDAY, WEDNESDAY, AND FRIDAY. TAKE 1 TABLET  (200MG) THE REST OF THE  OTHER DAYS       Last Written Prescription Date: 04/01/2019  Last Fill Quantity: 120, # refills: 1  Last Office Visit:  5/28/2019  Next Enc: 08/28/2019    Next 5 appointments (look out 90 days)    Aug 28, 2019  8:00 AM CDT  [facility] [address] [address] [place] [id number] [phone]          Last ophthalmology visit: 03/14/2019      WBC   Date Value Ref Range Status   06/28/2019 3.5 (L) 4.0 - 11.0 10e9/L Final     RBC Count   Date Value Ref Range Status   06/28/2019 4.52 3.8 - 5.2 10e12/L Final     Hemoglobin   Date Value Ref Range Status   06/28/2019 13.7 11.7 - 15.7 g/dL Final     Hematocrit   Date Value Ref Range Status   06/28/2019 41.8 35.0 - 47.0 % Final     MCV   Date Value Ref Range Status   06/28/2019 93 78 - 100 fl Final     MCH   Date Value Ref Range Status   06/28/2019 30.3 26.5 - 33.0 pg Final     MCHC   Date Value Ref Range Status   06/28/2019 32.8 31.5 - 36.5 g/dL Final     RDW   Date Value Ref Range Status   06/28/2019 12.3 10.0 - 15.0 % Final     Platelet Count   Date Value Ref Range Status   06/28/2019 211 150 - 450 10e9/L Final     AST   Date Value Ref Range Status   06/28/2019 18 0 - 45 U/L Final     ALT   Date Value Ref Range Status   06/28/2019 20 0 - 50 U/L Final     Creatinine   Date Value Ref Range Status   06/28/2019 0.87 0.52 - 1.04 mg/dL Final     Albumin   Date Value Ref Range Status   06/28/2019 4.0 3.4 - 5.0 g/dL Final     No results found for: CKTOTAL  CRP Inflammation   Date Value Ref Range Status   06/28/2019 <2.9 0.0 - 8.0 mg/L Final   02/26/2019 <2.9 0.0 - 8.0 mg/L Final   12/24/2018 <2.9 0.0 - 8.0 mg/L Final     Sed Rate   Date Value Ref Range Status   06/28/2019 4 0 - 20 mm/h  Final   12/24/2018 5 0 - 20 mm/h Final     Color Urine (no units)   Date Value   08/24/2007 Yellow     Appearance Urine (no units)   Date Value   08/24/2007 Clear     Glucose Urine (mg/dL)   Date Value   08/24/2007 Negative     Bilirubin Urine (no units)   Date Value   08/24/2007 Negative     Ketones Urine (mg/dL)   Date Value   08/24/2007 15 (A)     Specific Gravity Urine (no units)   Date Value   08/24/2007 <=1.005     pH Urine (pH)   Date Value   08/24/2007 7.0     Protein Albumin Urine (mg/dL)   Date Value   08/24/2007 Negative     Urobilinogen Urine (EU/dL)   Date Value   08/24/2007 0.2     Nitrite Urine (no units)   Date Value   08/24/2007 Negative     Leukocyte Esterase Urine (no units)   Date Value   08/24/2007 Negative

## 2019-08-26 ENCOUNTER — DOCUMENTATION ONLY (OUTPATIENT)
Dept: LAB | Facility: CLINIC | Age: 44
End: 2019-08-26

## 2019-08-26 DIAGNOSIS — M06.9 RHEUMATOID ARTHRITIS INVOLVING MULTIPLE SITES, UNSPECIFIED RHEUMATOID FACTOR PRESENCE: Primary | ICD-10-CM

## 2019-08-26 DIAGNOSIS — Z79.899 HIGH RISK MEDICATION USE: ICD-10-CM

## 2019-08-28 ENCOUNTER — OFFICE VISIT (OUTPATIENT)
Dept: RHEUMATOLOGY | Facility: CLINIC | Age: 44
End: 2019-08-28
Payer: COMMERCIAL

## 2019-08-28 VITALS
OXYGEN SATURATION: 98 % | SYSTOLIC BLOOD PRESSURE: 96 MMHG | HEART RATE: 77 BPM | BODY MASS INDEX: 23.47 KG/M2 | WEIGHT: 137.5 LBS | DIASTOLIC BLOOD PRESSURE: 67 MMHG | HEIGHT: 64 IN

## 2019-08-28 DIAGNOSIS — M06.09 SERONEGATIVE RHEUMATOID ARTHRITIS OF MULTIPLE SITES (H): ICD-10-CM

## 2019-08-28 DIAGNOSIS — Z79.899 HIGH RISK MEDICATION USE: ICD-10-CM

## 2019-08-28 DIAGNOSIS — M06.9 RHEUMATOID ARTHRITIS INVOLVING MULTIPLE SITES, UNSPECIFIED RHEUMATOID FACTOR PRESENCE: ICD-10-CM

## 2019-08-28 LAB
ALBUMIN SERPL-MCNC: 3.9 G/DL (ref 3.4–5)
ALT SERPL W P-5'-P-CCNC: 30 U/L (ref 0–50)
AST SERPL W P-5'-P-CCNC: 30 U/L (ref 0–45)
CREAT SERPL-MCNC: 0.76 MG/DL (ref 0.52–1.04)
CRP SERPL-MCNC: <2.9 MG/L (ref 0–8)
ERYTHROCYTE [DISTWIDTH] IN BLOOD BY AUTOMATED COUNT: 12.8 % (ref 10–15)
ERYTHROCYTE [SEDIMENTATION RATE] IN BLOOD BY WESTERGREN METHOD: 5 MM/H (ref 0–20)
GFR SERPL CREATININE-BSD FRML MDRD: >90 ML/MIN/{1.73_M2}
HCT VFR BLD AUTO: 40.4 % (ref 35–47)
HGB BLD-MCNC: 13.4 G/DL (ref 11.7–15.7)
MCH RBC QN AUTO: 31.1 PG (ref 26.5–33)
MCHC RBC AUTO-ENTMCNC: 33.2 G/DL (ref 31.5–36.5)
MCV RBC AUTO: 94 FL (ref 78–100)
PLATELET # BLD AUTO: 206 10E9/L (ref 150–450)
RBC # BLD AUTO: 4.31 10E12/L (ref 3.8–5.2)
WBC # BLD AUTO: 3.6 10E9/L (ref 4–11)

## 2019-08-28 PROCEDURE — 84460 ALANINE AMINO (ALT) (SGPT): CPT | Performed by: STUDENT IN AN ORGANIZED HEALTH CARE EDUCATION/TRAINING PROGRAM

## 2019-08-28 PROCEDURE — 82040 ASSAY OF SERUM ALBUMIN: CPT | Performed by: STUDENT IN AN ORGANIZED HEALTH CARE EDUCATION/TRAINING PROGRAM

## 2019-08-28 PROCEDURE — 85027 COMPLETE CBC AUTOMATED: CPT | Performed by: STUDENT IN AN ORGANIZED HEALTH CARE EDUCATION/TRAINING PROGRAM

## 2019-08-28 PROCEDURE — 82565 ASSAY OF CREATININE: CPT | Performed by: STUDENT IN AN ORGANIZED HEALTH CARE EDUCATION/TRAINING PROGRAM

## 2019-08-28 PROCEDURE — 86140 C-REACTIVE PROTEIN: CPT | Performed by: STUDENT IN AN ORGANIZED HEALTH CARE EDUCATION/TRAINING PROGRAM

## 2019-08-28 PROCEDURE — 36415 COLL VENOUS BLD VENIPUNCTURE: CPT | Performed by: STUDENT IN AN ORGANIZED HEALTH CARE EDUCATION/TRAINING PROGRAM

## 2019-08-28 PROCEDURE — 84450 TRANSFERASE (AST) (SGOT): CPT | Performed by: STUDENT IN AN ORGANIZED HEALTH CARE EDUCATION/TRAINING PROGRAM

## 2019-08-28 PROCEDURE — 99214 OFFICE O/P EST MOD 30 MIN: CPT | Performed by: STUDENT IN AN ORGANIZED HEALTH CARE EDUCATION/TRAINING PROGRAM

## 2019-08-28 PROCEDURE — 85652 RBC SED RATE AUTOMATED: CPT | Performed by: STUDENT IN AN ORGANIZED HEALTH CARE EDUCATION/TRAINING PROGRAM

## 2019-08-28 RX ORDER — FOLIC ACID 1 MG/1
2 TABLET ORAL DAILY
Qty: 90 TABLET | Refills: 3 | Status: SHIPPED | OUTPATIENT
Start: 2019-08-28 | End: 2020-01-07

## 2019-08-28 RX ORDER — HYDROXYCHLOROQUINE SULFATE 200 MG/1
TABLET, FILM COATED ORAL
Qty: 180 TABLET | Refills: 1 | Status: SHIPPED | OUTPATIENT
Start: 2019-08-28 | End: 2019-12-02

## 2019-08-28 ASSESSMENT — ROUTINE ASSESSMENT OF PATIENT INDEX DATA (RAPID3)
TOTAL RAPID3 SCORE: 10.7
RAPID3 INTERPRETATION: MODERATE 6.1-12.0

## 2019-08-28 ASSESSMENT — MIFFLIN-ST. JEOR: SCORE: 1258.7

## 2019-08-28 ASSESSMENT — PAIN SCALES - GENERAL: PAINLEVEL: MODERATE PAIN (4)

## 2019-08-28 NOTE — NURSING NOTE
"Lolita Harris's goals for this visit include:   She requests these members of her care team be copied on today's visit information:     PCP: No Ref-Primary, Physician    Referring Provider:  No referring provider defined for this encounter.    BP 96/67   Pulse 77   Ht 1.626 m (5' 4\")   Wt 62.4 kg (137 lb 8 oz)   SpO2 98%   BMI 23.60 kg/m      "

## 2019-08-28 NOTE — RESULT ENCOUNTER NOTE
Results discussed with the patient at the time of visit.     Alejandro Menezes MD   8/28/2019  8:25 AM

## 2019-08-28 NOTE — PROGRESS NOTES
Rheumatology Clinic Visit     Lolita Harris MRN# [id number]   YOB: 1975 Age: 43 year old     Date of Visit: Aug 28, 2019  Primary care provider: No Ref-Primary, Physician          Assessment and Plan:   Assessment     -- Seropositive Rheumatoid arthritis - + ACPA, - RF  -- Hx of positive ACPA - 25.6 - 9/2018  -- Leukopenia   -- Hypothyroidism  -- Neg ADRIANNA, VIVIAN, Normal C3/C4.     Ms Harris is 45 yo female seen in clinic for evaluation of joint pains.     Seropositive Rheumatoid arthritis: She had pain in her joints mainly right index finger, wrists, knees, hips  summer 2018 along with stiffness. On first visit in 12/2018 she had tenderness over the right index finger PIP joint, right fifth PIP joint.  Mild tenderness present over the right wrist, right elbow.  Mild tenderness present over the left fifth PIP joint.  Tenderness present over bilateral ankles and MTP joints.  Normal range of motion of bilateral knees, hips and shoulders.    Her autoimmune work in 12/2018 showed normal RF, ACPA, ADRIANNA. But she had + ACPA in 9/2018. Her symptoms were consistent with seropositive Rheumatoid arthritis and was started on Plaquenil 300 mg daily dose in 1/2019.     She has noted improvement in her symptoms mainly swelling in her hands but still c/o achiness, denies any side effects with Plaquenil. Methotrexate added in 5/2019.  She has noticed improvement with combination of Plaquenil methotrexate.  Stiffness and joint pains have improved.  Complains of hair loss with methotrexate use.  Will increase methotrexate to 8 tablets once a week and increase folic acid to 2 or 3 tablets daily to prevent hair loss.      Right shoulder and neck pain : She had somewhat vertical accident in May 2018.  Since the accident she has pain in the right shoulder and neck.  She follows with chiropractor has been doing well treated with physical therapy since May.  Noticed improvement in the shoulder pain yet.  MRI of the right  shoulder done on 7/20/2019 showed mild AC joint arthritis, subscapularis tendinosis, no rotator cuff tear, synovitis or effusion present.  According to her C-spine MRI showed mild disc bulge.  She will continue physical therapy for now.  If the condition will not improve she can see orthopedics specialist.    High risk medication use: Monitoring labs done in today 8/28/19 showed normal AST, ALT, creatinine, white count of 3.6 otherwise normal CBC.  She had eye exam done for Plaquenil toxicity in March 2018 and it is normal OCT.     Plan    1.  Blood tests: CBC, AST/ALT,Cr, CRP in a month after increasing Methotrexate dose.     2. Increase Methotrexate to 8 tab once a week   --q 3 mo AST/ALT, Albumin, CBC with plts  --Limit EtOH intake to 2 drinks weekly; use folate 1 mg daily.  --Tylenol 500 mg tid prn nausea/HA associated with dosing.    3. Continue Plaquenil 400 mg on M/W/F and 200 mg on rest of the days.     4.  Return to clinic in 3 months          Active Problem List:     Patient Active Problem List    Diagnosis Date Noted     Inflammatory arthritis      Priority: Medium     Pelvic pain in female 12/28/2015     Priority: Medium            History of Present Illness:   Lolita Harris is a 43 year old female with PMH of hypothyroidism, Leukopenia seen in the clinic in consultation at request of Murali Smith for evaluation of joint pains.     August 28, 2019 - She has started Methotrexate in May, joint pains better with it. Stiffness has improved. She is on Plaquenil 300 mg + 6 tab of Methotrexate once a week. She has noticed hair loss with Methotrexate. She had eye exam in 3/14/19 and was normal. She had MRI of C spine and right shoulder on 7/16/19. Right shoulder MRI showed subscapularis tendinosis and mild AC joint arthritis, no rotator cuff tear seen.      May 28, 2019 - She still has achiness in her hands, exercise is bothersome. Achiness is in her knees. In the past it was more in the winter time but  she has pain even now in this weather. If she go on for a walk her hips gets sore. Stiffness in the morning in hips, feet, ankles for about couple hours. She has recurrent UTI's since January 2019, She has seen Urologist who thinks it can be related to hormonal imbalance and intercourse.     February 26, 2019 - She has noted some improvement in her joints, they still feel stiff and sore in the morning. Right hurt more espcially to open things. She is on Plaquenil since Jan 9th, 2019. Denies any side effects with Plaquenil. No new skin rash pleurisy, oral sores etc.     History from initial visit : She has swelling in her right index finger since summer 2018, others fingers are mildly stiff as well. Wrists are sore. Left hand is slightly sore. Mostly in the morning, better as the day goes on. Once in a while she has pain in her ankles. She has back pain and muscle tightness. Can not do exercises by moving her shoulders over her head. If she sit too long she has pain in her hips. She has knee pain for 3 - 4 years.     She has fatigue for years. C/o dry eyes and has inflammation in her gums due to lichen Planus. She has recurrent sinus issues, nose feels congested. She has photosensitivity and burn easily in the sun.      She has seen Dr Ledezma in Rheumatology in 2015 for evaluation of lupus in the setting of Leukopenia. Her ADRIANNA, ACPA, RF, ESR were negative at the time and no diagnosis of autoimmune connective tissue was made. She has seen hematologist for leukopenia in the past and had bone marrow biopsy which was normal.   No history of psoriasis, ulcerative colitis or chron's disease. No h/o iritis, enthesitis, finger or toe swelling like dactylitis, plantar fascitis or heel pain. Denies any raynauds, malar rash, photosensitivity, recurrent mouth/genital ulcers, sicca symptoms, pleuritic chest pains, swallowing difficulty, hearing or visual changes recently.  No h/o arterial/venous thrombosis in the past. Denies  any tick bite, recent GI/ infection.             Review of Systems:   Review Of Systems  Constitutional: denies fever, chills, night sweats and weight loss.  Skin: No skin rash.  Eyes: + dryness or irritation in eyes. No episode of eye inflammation or redness.   Ears/Nose/Throat: no recurrent sinus infections.  Respiratory: No shortness of breath, dyspnea on exertion, cough, or hemoptysis  Cardiovascular: no chest pain or palpitations.  Gastrointestinal: no nausea, vomiting, abdominal pain.  Normal bowel movements.  Genitourinary: no dysuria, frequency  or hematuria.  Musculoskeletal: as in HPI  Neurologic: no numbness, tingling.  Psychiatric: no mood disorders.  Hematologic/Lymphatic/Immunologic: no history of easy bruising, petechia or purpura.  No abnormal bleeding.   Endocrine: + h/o thyroid disease or Diabetes.                  Past Medical History:     Past Medical History:   Diagnosis Date     Abnormal glandular Papanicolaou smear of cervix      Endometriosis      Hypothyroid      Inflammatory arthritis      PONV (postoperative nausea and vomiting)      Past Surgical History:   Procedure Laterality Date      SECTION       LAPAROSCOPY OPERATIVE ADULT  95,97,2001 rso,10 darlyn     LAPAROSCOPY,VAG HYSTERECTOMY  2008    lso     LASER CO2 LAPAROSCOPY DIAGNOSTIC, LYSIS ADHESIONS, COMBINED N/A 2016    Procedure: COMBINED LASER CO2 LAPAROSCOPY DIAGNOSTIC, LYSIS ADHESIONS;  Surgeon: Zay Archuleta MD;  Location: [facility]            Social History:     Social History     Occupational History     Not on file   Tobacco Use     Smoking status: Never Smoker     Smokeless tobacco: Never Used   Substance and Sexual Activity     Alcohol use: Yes     Alcohol/week: 0.0 oz     Comment: rarely     Drug use: No     Sexual activity: Yes     Partners: Male     Birth control/protection: Female Surgical     Comment: hysterectomy            Family History:     Family History   Problem Relation Age of Onset      "Hyperlipidemia Father      Thyroid Disease Father             Allergies:     Allergies   Allergen Reactions     Penicillins Rash     Childhood rxn            Medications:     Current Outpatient Medications   Medication Sig Dispense Refill     Calcium-Magnesium-Zinc 333-133-5 MG TABS        fluticasone (FLONASE) 50 MCG/ACT nasal spray as needed  0     folic acid (FOLVITE) 1 MG tablet Take 1 tablet (1 mg) by mouth daily 90 tablet 3     guaiFENesin (MUCINEX) 600 MG 12 hr tablet Take by mouth as needed        hydroxychloroquine (PLAQUENIL) 200 MG tablet TAKE 2 TABLETS (400MG) BY  MOUTH ON MONDAY, WEDNESDAY, AND FRIDAY. TAKE 1 TABLET  (200MG) THE REST OF THE  OTHER DAYS 130 tablet 1     levothyroxine (SYNTHROID, LEVOTHROID) 75 MCG tablet Take 75 mcg by mouth daily        methotrexate 2.5 MG tablet 4 tab once a week and increase by 1 tab every week to reach at 6 tab once a week dose. 24 tablet 2     nitroFURantoin macrocrystal-monohydrate (MACROBID) 100 MG capsule Take 100 mg by mouth 2 times daily as needed        Omega-3 Fatty Acids (FISH OIL PO) Take by mouth daily       estradiol (ESTRACE) 1 MG tablet Take 1 tablet (1 mg) by mouth daily (Patient not taking: Reported on 12/24/2018) 90 tablet 1            Physical Exam:   Blood pressure 96/67, pulse 77, height 1.626 m (5' 4\"), weight 62.4 kg (137 lb 8 oz), SpO2 98 %, not currently breastfeeding.  Wt Readings from Last 4 Encounters:   08/28/19 62.4 kg (137 lb 8 oz)   05/28/19 63 kg (138 lb 12.8 oz)   02/26/19 63 kg (139 lb)   12/24/18 63 kg (139 lb)       Constitutional: well-developed, appearing stated age; cooperative  Eyes: nl EOM, PERRLA, vision, conjunctiva, sclera  ENT: nl external ears, nose, hearing, lips, teeth, gums, throat  No mucous membrane lesions, normal saliva pool  Neck: no mass or thyroid enlargement  Resp: lungs clear to auscultation, nl to palpation  CV: RRR, no murmurs, rubs or gallops, no edema  GI: no ABD mass or tenderness, no HSM  : not " tested  Lymph: no cervical, supraclavicular, inguinal or epitrochlear nodes    MS: All TMJ, neck, shoulder, elbow, wrist, MCP/PIP/DIP, spine, hip, knee, ankle, and foot MTP/IP joints were examined.     -- Tenderness over PIP joints, MCP joints resolved, no synovitis.  No tenderness over elbows. Tenderness over bilateral ankles and MTP joints better.  Normal range of motion of bilateral knees, hips and shoulders.    -- No dactylitis,  tenosynovitis, enthesopathy.    Skin: no nail pitting, alopecia, rash, nodules or lesions  Neuro: nl cranial nerves, strength, sensation, DTRs.   Psych: nl judgement, orientation, memory, affect.         Data:     Results for orders placed or performed in visit on 08/28/19   Erythrocyte sedimentation rate auto   Result Value Ref Range    Sed Rate 5 0 - 20 mm/h   CRP inflammation   Result Value Ref Range    CRP Inflammation <2.9 0.0 - 8.0 mg/L   Creatinine   Result Value Ref Range    Creatinine 0.76 0.52 - 1.04 mg/dL    GFR Estimate >90 >60 mL/min/[1.73_m2]    GFR Estimate If Black >90 >60 mL/min/[1.73_m2]   CBC with platelets   Result Value Ref Range    WBC 3.6 (L) 4.0 - 11.0 10e9/L    RBC Count 4.31 3.8 - 5.2 10e12/L    Hemoglobin 13.4 11.7 - 15.7 g/dL    Hematocrit 40.4 35.0 - 47.0 %    MCV 94 78 - 100 fl    MCH 31.1 26.5 - 33.0 pg    MCHC 33.2 31.5 - 36.5 g/dL    RDW 12.8 10.0 - 15.0 %    Platelet Count 206 150 - 450 10e9/L   ALT   Result Value Ref Range    ALT 30 0 - 50 U/L   AST   Result Value Ref Range    AST 30 0 - 45 U/L   Albumin level   Result Value Ref Range    Albumin 3.9 3.4 - 5.0 g/dL       Recent Labs   Lab Test 01/17/17  1402 12/19/16  1621   HGB 14.1 13.7      No results for input(s): TSH, T4 in the last 17734 hours.  Hemoglobin   Date Value Ref Range Status   08/28/2019 13.4 11.7 - 15.7 g/dL Final   06/28/2019 13.7 11.7 - 15.7 g/dL Final   02/26/2019 13.6 11.7 - 15.7 g/dL Final     Sed Rate   Date Value Ref Range Status   08/28/2019 5 0 - 20 mm/h Final   06/28/2019 4 0  - 20 mm/h Final   12/24/2018 5 0 - 20 mm/h Final     CRP Inflammation   Date Value Ref Range Status   08/28/2019 <2.9 0.0 - 8.0 mg/L Final   06/28/2019 <2.9 0.0 - 8.0 mg/L Final   02/26/2019 <2.9 0.0 - 8.0 mg/L Final     AST   Date Value Ref Range Status   08/28/2019 30 0 - 45 U/L Final   06/28/2019 18 0 - 45 U/L Final   02/26/2019 17 0 - 45 U/L Final     Albumin   Date Value Ref Range Status   08/28/2019 3.9 3.4 - 5.0 g/dL Final   06/28/2019 4.0 3.4 - 5.0 g/dL Final   02/26/2019 3.9 3.4 - 5.0 g/dL Final     ALT   Date Value Ref Range Status   08/28/2019 30 0 - 50 U/L Final   06/28/2019 20 0 - 50 U/L Final   02/26/2019 22 0 - 50 U/L Final     Rheumatoid Factor   Date Value Ref Range Status   12/24/2018 <20 <20 IU/mL Final     Recent Labs   Lab Test 08/28/19  0748 06/28/19  0832 02/26/19  0918   WBC 3.6* 3.5* 3.1*   HGB 13.4 13.7 13.6   HCT 40.4 41.8 41.6   MCV 94 93 92    211 187   AST 30 18 17   ALT 30 20 22       Outside studies reviewed:   ANTINUCLEAR ANTIBODY (ADRIANNA) Negative Negative [facility]AT[facility]     ADRIANNA TEST   Specimen   Blood - Blood     ADRIANNA TEST   Performing Organization Address City/State/Zipcode Phone Number   [facility]   [address]   [place] [address]      Back to top of Lab Results       CYCLIC CITRULLINE PEPTIDE  CYCLIC CITRULLINE PEPTIDE   Component Value Ref Range Performed At   CCP Antibody,IgG/IgA 25.6 (H) <=19.9 [facility]     CYCLIC CITRULLINE PEPTIDE   Specimen   Blood - Blood     CYCLIC CITRULLINE PEPTIDE   Narrative Performed At   Negative <20    Positive >=20    The following results were obtained with the [employer] QUANTA Flash CCP3 chemiluminescent immunoassay. Values obtained with different manufacturers' assay methods may not be used interchangeably.       [facility]       CYCLIC CITRULLINE PEPTIDE   Performing Organization Address  City/State/Zipcode Phone Number   [facility]   [address]   [place] [address]      Back to top of Lab Results       LYME SCREEN W/REFLEX  LYME SCREEN W/REFLEX   Component Value Ref Range Performed At   LYME SCREEN W/REFLEX WEST BLOT NegativeComment: No detectable antibody; result does not exclude B. burgdorferi infection. An additional sample should be tested within 4-6 weeks if early infection is suspected. Negative [facility]     LYME SCREEN W/REFLEX   Specimen   Blood - Blood     LYME SCREEN W/REFLEX   Performing Organization Address City/[facility] Phone Number   [facility]   [address]   [place] [address]      Back to top of Lab Results       RA QUANTITATIVE  RA QUANTITATIVE   Component Value Ref Range Performed At   RHEUMATOID FACTOR,QUANT <15.9 <15.9 IU/mL [facility]     RA QUANTITATIVE   Specimen   Blood - Blood     RA QUANTITATIVE   Performing Organization Address City/[facility] Phone Number   [facility]   [address]   [place] [address]      Back to top of Lab Results       SEDIMENTATION RATE  SEDIMENTATION RATE   Component Value Ref Range Performed At   SEDIMENTATION RATE  3 <21 mm/hr [facility]     SEDIMENTATION RATE   Specimen   Blood - Blood     SEDIMENTATION RATE   Performing Organization Address City/[facility] Phone Number   [facility]   [address] [place] [address] [phone]     Back to top of Lab Results       C-REACTIVE PROTEIN  C-REACTIVE PROTEIN   Component Value Ref Range Performed At   C-REACTIVE PROTEIN  0.06 <0.50 mg/dL [facility]     C-REACTIVE PROTEIN   Specimen   Blood - Blood     C-REACTIVE PROTEIN   Performing Organization Address City/[facility] Cent[facility]ode Phone  Number   [facility] LABORATORY-CENTRAL LABORATORY   [address]           Clinical History: Finger sprain.        Procedure: XR FINGER 3 VIEWS RIGHT        Comparison: None.        Findings: No fracture or subluxation. There is normal joint spacing and alignment. No osseous lesion is identified. No soft tissue abnormality is evident.        Impression: Unremarkable right finger examination.     Reviewed Rheumatology lab flowsheet    Alejandro Menezes MD  [facility]  Department of Rheumatology & Autoimmune Disorders  [facility]: [phone]   Pager - [phone]

## 2019-09-30 DIAGNOSIS — M06.9 RHEUMATOID ARTHRITIS INVOLVING MULTIPLE SITES, UNSPECIFIED RHEUMATOID FACTOR PRESENCE: ICD-10-CM

## 2019-09-30 DIAGNOSIS — M06.09 SERONEGATIVE RHEUMATOID ARTHRITIS OF MULTIPLE SITES (H): ICD-10-CM

## 2019-09-30 LAB
ALBUMIN SERPL-MCNC: 3.9 G/DL (ref 3.4–5)
ALT SERPL W P-5'-P-CCNC: 26 U/L (ref 0–50)
AST SERPL W P-5'-P-CCNC: 23 U/L (ref 0–45)
CREAT SERPL-MCNC: 0.81 MG/DL (ref 0.52–1.04)
CRP SERPL-MCNC: <2.9 MG/L (ref 0–8)
ERYTHROCYTE [DISTWIDTH] IN BLOOD BY AUTOMATED COUNT: 12.5 % (ref 10–15)
ERYTHROCYTE [SEDIMENTATION RATE] IN BLOOD BY WESTERGREN METHOD: 4 MM/H (ref 0–20)
GFR SERPL CREATININE-BSD FRML MDRD: 88 ML/MIN/{1.73_M2}
HCT VFR BLD AUTO: 40 % (ref 35–47)
HGB BLD-MCNC: 13.2 G/DL (ref 11.7–15.7)
MCH RBC QN AUTO: 31.1 PG (ref 26.5–33)
MCHC RBC AUTO-ENTMCNC: 33 G/DL (ref 31.5–36.5)
MCV RBC AUTO: 94 FL (ref 78–100)
PLATELET # BLD AUTO: 192 10E9/L (ref 150–450)
RBC # BLD AUTO: 4.24 10E12/L (ref 3.8–5.2)
WBC # BLD AUTO: 2.9 10E9/L (ref 4–11)

## 2019-09-30 PROCEDURE — 85027 COMPLETE CBC AUTOMATED: CPT | Performed by: STUDENT IN AN ORGANIZED HEALTH CARE EDUCATION/TRAINING PROGRAM

## 2019-09-30 PROCEDURE — 85652 RBC SED RATE AUTOMATED: CPT | Performed by: STUDENT IN AN ORGANIZED HEALTH CARE EDUCATION/TRAINING PROGRAM

## 2019-09-30 PROCEDURE — 36415 COLL VENOUS BLD VENIPUNCTURE: CPT | Performed by: STUDENT IN AN ORGANIZED HEALTH CARE EDUCATION/TRAINING PROGRAM

## 2019-09-30 PROCEDURE — 84460 ALANINE AMINO (ALT) (SGPT): CPT | Performed by: STUDENT IN AN ORGANIZED HEALTH CARE EDUCATION/TRAINING PROGRAM

## 2019-09-30 PROCEDURE — 84450 TRANSFERASE (AST) (SGOT): CPT | Performed by: STUDENT IN AN ORGANIZED HEALTH CARE EDUCATION/TRAINING PROGRAM

## 2019-09-30 PROCEDURE — 82565 ASSAY OF CREATININE: CPT | Performed by: STUDENT IN AN ORGANIZED HEALTH CARE EDUCATION/TRAINING PROGRAM

## 2019-09-30 PROCEDURE — 86140 C-REACTIVE PROTEIN: CPT | Performed by: STUDENT IN AN ORGANIZED HEALTH CARE EDUCATION/TRAINING PROGRAM

## 2019-09-30 PROCEDURE — 82040 ASSAY OF SERUM ALBUMIN: CPT | Performed by: STUDENT IN AN ORGANIZED HEALTH CARE EDUCATION/TRAINING PROGRAM

## 2019-11-04 ENCOUNTER — HEALTH MAINTENANCE LETTER (OUTPATIENT)
Age: 44
End: 2019-11-04

## 2019-11-26 DIAGNOSIS — M06.9 RHEUMATOID ARTHRITIS INVOLVING MULTIPLE SITES, UNSPECIFIED RHEUMATOID FACTOR PRESENCE: ICD-10-CM

## 2019-11-27 NOTE — TELEPHONE ENCOUNTER
Medication/Dose:methotrexate 2.5 MG tablet  Last Written Prescription Date: 8/28/19  Last Fill Quantity: 96, # refills: 0  Last Office Visit:  8/28/2019  Next Enc:  Visit date not found  Next 5 appointments (look out 90 days)    Dec 02, 2019  8:30 AM CST  Return Visit with Alejandro Menezes MD  [facility] [address] [place] [address] [phone]          Standing lab orders every 8-12 weeks    Set up for 90 day supply, 0 refills    Routed to Dr. Menezes    for review and approval of medication request.    According to [facility] Rheumatology Refill protocol:           WBC   Date Value Ref Range Status   09/30/2019 2.9 (L) 4.0 - 11.0 10e9/L Final     RBC Count   Date Value Ref Range Status   09/30/2019 4.24 3.8 - 5.2 10e12/L Final     Hemoglobin   Date Value Ref Range Status   09/30/2019 13.2 11.7 - 15.7 g/dL Final     Hematocrit   Date Value Ref Range Status   09/30/2019 40.0 35.0 - 47.0 % Final     MCV   Date Value Ref Range Status   09/30/2019 94 78 - 100 fl Final     MCH   Date Value Ref Range Status   09/30/2019 31.1 26.5 - 33.0 pg Final     MCHC   Date Value Ref Range Status   09/30/2019 33.0 31.5 - 36.5 g/dL Final     RDW   Date Value Ref Range Status   09/30/2019 12.5 10.0 - 15.0 % Final     Platelet Count   Date Value Ref Range Status   09/30/2019 192 150 - 450 10e9/L Final     AST   Date Value Ref Range Status   09/30/2019 23 0 - 45 U/L Final     ALT   Date Value Ref Range Status   09/30/2019 26 0 - 50 U/L Final     Creatinine   Date Value Ref Range Status   09/30/2019 0.81 0.52 - 1.04 mg/dL Final     Albumin   Date Value Ref Range Status   09/30/2019 3.9 3.4 - 5.0 g/dL Final     No results found for: CKTOTAL  CRP Inflammation   Date Value Ref Range Status   09/30/2019 <2.9 0.0 - 8.0 mg/L Final   08/28/2019 <2.9 0.0 - 8.0 mg/L Final   06/28/2019 <2.9 0.0 - 8.0 mg/L Final     Sed Rate   Date Value Ref Range Status   09/30/2019 4 0 - 20 mm/h  Final   08/28/2019 5 0 - 20 mm/h Final   06/28/2019 4 0 - 20 mm/h Final     Color Urine (no units)   Date Value   08/24/2007 Yellow     Appearance Urine (no units)   Date Value   08/24/2007 Clear     Glucose Urine (mg/dL)   Date Value   08/24/2007 Negative     Bilirubin Urine (no units)   Date Value   08/24/2007 Negative     Ketones Urine (mg/dL)   Date Value   08/24/2007 15 (A)     Specific Gravity Urine (no units)   Date Value   08/24/2007 <=1.005     pH Urine (pH)   Date Value   08/24/2007 7.0     Protein Albumin Urine (mg/dL)   Date Value   08/24/2007 Negative     Urobilinogen Urine (EU/dL)   Date Value   08/24/2007 0.2     Nitrite Urine (no units)   Date Value   08/24/2007 Negative     Leukocyte Esterase Urine (no units)   Date Value   08/24/2007 Negative         Marlen Crisostomo, LAKEN, RN   Rheumatology Care Coordinator   [facility]

## 2019-12-02 ENCOUNTER — OFFICE VISIT (OUTPATIENT)
Dept: RHEUMATOLOGY | Facility: CLINIC | Age: 44
End: 2019-12-02
Payer: COMMERCIAL

## 2019-12-02 VITALS
OXYGEN SATURATION: 98 % | SYSTOLIC BLOOD PRESSURE: 91 MMHG | BODY MASS INDEX: 22.96 KG/M2 | WEIGHT: 134.5 LBS | HEIGHT: 64 IN | DIASTOLIC BLOOD PRESSURE: 63 MMHG | HEART RATE: 74 BPM

## 2019-12-02 DIAGNOSIS — M06.09 SERONEGATIVE RHEUMATOID ARTHRITIS OF MULTIPLE SITES (H): ICD-10-CM

## 2019-12-02 DIAGNOSIS — Z79.899 HIGH RISK MEDICATION USE: ICD-10-CM

## 2019-12-02 DIAGNOSIS — M06.9 RHEUMATOID ARTHRITIS INVOLVING MULTIPLE SITES, UNSPECIFIED RHEUMATOID FACTOR PRESENCE: ICD-10-CM

## 2019-12-02 LAB
ALBUMIN SERPL-MCNC: 3.8 G/DL (ref 3.4–5)
ALT SERPL W P-5'-P-CCNC: 27 U/L (ref 0–50)
AST SERPL W P-5'-P-CCNC: 27 U/L (ref 0–45)
CREAT SERPL-MCNC: 0.82 MG/DL (ref 0.52–1.04)
CRP SERPL-MCNC: <2.9 MG/L (ref 0–8)
ERYTHROCYTE [DISTWIDTH] IN BLOOD BY AUTOMATED COUNT: 12.5 % (ref 10–15)
ERYTHROCYTE [SEDIMENTATION RATE] IN BLOOD BY WESTERGREN METHOD: 4 MM/H (ref 0–20)
GFR SERPL CREATININE-BSD FRML MDRD: 87 ML/MIN/{1.73_M2}
HCT VFR BLD AUTO: 39.7 % (ref 35–47)
HGB BLD-MCNC: 12.9 G/DL (ref 11.7–15.7)
MCH RBC QN AUTO: 30.9 PG (ref 26.5–33)
MCHC RBC AUTO-ENTMCNC: 32.5 G/DL (ref 31.5–36.5)
MCV RBC AUTO: 95 FL (ref 78–100)
PLATELET # BLD AUTO: 193 10E9/L (ref 150–450)
RBC # BLD AUTO: 4.18 10E12/L (ref 3.8–5.2)
WBC # BLD AUTO: 3.3 10E9/L (ref 4–11)

## 2019-12-02 PROCEDURE — 85652 RBC SED RATE AUTOMATED: CPT | Performed by: STUDENT IN AN ORGANIZED HEALTH CARE EDUCATION/TRAINING PROGRAM

## 2019-12-02 PROCEDURE — 86140 C-REACTIVE PROTEIN: CPT | Performed by: STUDENT IN AN ORGANIZED HEALTH CARE EDUCATION/TRAINING PROGRAM

## 2019-12-02 PROCEDURE — 85027 COMPLETE CBC AUTOMATED: CPT | Performed by: STUDENT IN AN ORGANIZED HEALTH CARE EDUCATION/TRAINING PROGRAM

## 2019-12-02 PROCEDURE — 36415 COLL VENOUS BLD VENIPUNCTURE: CPT | Performed by: STUDENT IN AN ORGANIZED HEALTH CARE EDUCATION/TRAINING PROGRAM

## 2019-12-02 PROCEDURE — 84460 ALANINE AMINO (ALT) (SGPT): CPT | Performed by: STUDENT IN AN ORGANIZED HEALTH CARE EDUCATION/TRAINING PROGRAM

## 2019-12-02 PROCEDURE — 84450 TRANSFERASE (AST) (SGOT): CPT | Performed by: STUDENT IN AN ORGANIZED HEALTH CARE EDUCATION/TRAINING PROGRAM

## 2019-12-02 PROCEDURE — 99214 OFFICE O/P EST MOD 30 MIN: CPT | Performed by: STUDENT IN AN ORGANIZED HEALTH CARE EDUCATION/TRAINING PROGRAM

## 2019-12-02 PROCEDURE — 82040 ASSAY OF SERUM ALBUMIN: CPT | Performed by: STUDENT IN AN ORGANIZED HEALTH CARE EDUCATION/TRAINING PROGRAM

## 2019-12-02 PROCEDURE — 82565 ASSAY OF CREATININE: CPT | Performed by: STUDENT IN AN ORGANIZED HEALTH CARE EDUCATION/TRAINING PROGRAM

## 2019-12-02 RX ORDER — HYDROXYCHLOROQUINE SULFATE 200 MG/1
TABLET, FILM COATED ORAL
Qty: 180 TABLET | Refills: 1 | Status: SHIPPED | OUTPATIENT
Start: 2019-12-02 | End: 2020-06-02

## 2019-12-02 ASSESSMENT — MIFFLIN-ST. JEOR: SCORE: 1245.09

## 2019-12-02 ASSESSMENT — PAIN SCALES - GENERAL: PAINLEVEL: MODERATE PAIN (4)

## 2019-12-02 NOTE — PROGRESS NOTES
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     Ms Harris is 45 yo female seen in clinic for evaluation of joint pains.     Seropositive Rheumatoid arthritis: She had pain in her joints mainly right index finger, wrists, knees, hips  summer 2018 along with stiffness. On first visit in 12/2018 she had tenderness over the right index finger PIP joint, right fifth PIP joint.  Mild tenderness present over the right wrist, right elbow.  Mild tenderness present over the left fifth PIP joint.  Tenderness present over bilateral ankles and MTP joints.  Normal range of motion of bilateral knees, hips and shoulders.    Her autoimmune work in 12/2018 showed normal RF, ACPA, ADRIANNA. But she had + ACPA in 9/2018. Her symptoms were consistent with seropositive Rheumatoid arthritis and was started on Plaquenil 300 mg daily dose in 1/2019.     She has noted improvement in her symptoms mainly swelling in her hands but still c/o achiness, denies any side effects with Plaquenil. Methotrexate added in 5/2019.  She has noticed improvement with combination of Plaquenil methotrexate.  Stiffness and joint pains have improved. Hair loss side effect with methotrexate have improved.      Right shoulder and neck pain : It has improved some but not totally better. She had injury to her shoulder in May 2018.  MRI of the right shoulder done on 7/20/2019 showed mild AC joint arthritis, subscapularis tendinosis, no rotator cuff tear, synovitis or effusion present.  According to her C-spine MRI showed mild disc bulge. She did PT.  If the condition will not  improve she can see orthopedics specialist.    High risk medication use: Monitoring labs done today 12/2/19 showed normal AST, ALT, creatinine, white count of 3.3 otherwise normal CBC.  She had eye exam done for Plaquenil toxicity in March 2019 and it is normal OCT.     Plan    1.  Blood tests: CBC, AST/ALT,Cr, CRP every 8 - 12 weeks.     2. Continue Methotrexate 8 tab once a week   --q 3 mo AST/ALT, Albumin, CBC with plts  --Limit EtOH intake to 2 drinks weekly; use folate 1 mg daily.  --Tylenol 500 mg tid prn nausea/HA associated with dosing.    3. Continue Plaquenil 400 mg on M/W/F and 200 mg on rest of the days.     4.  Return to clinic in 6 months          Active Problem List:     Patient Active Problem List    Diagnosis Date Noted     Inflammatory arthritis      Priority: Medium     Pelvic pain in female 12/28/2015     Priority: Medium            History of Present Illness:   Lolita Harris is a 43 year old female with PMH of hypothyroidism, Leukopenia seen in the clinic in consultation at request of Murali Smith for evaluation of joint pains.     December 2, 2019 - She feels better with Plaquenil and Methotrexate combination, but it is not completely better. This weekend she has some extra pain in her hands. Knees bother her more in the winter. Hair loss has improved little bit. She had eye exam in 3/2019 and was normal. Denies any side effects with HCQ and Methotrexate.     August 28, 2019 - She has started Methotrexate in May, joint pains better with it. Stiffness has improved. She is on Plaquenil 300 mg + 6 tab of Methotrexate once a week. She has noticed hair loss with Methotrexate. She had eye exam in 3/14/19 and was normal. She had MRI of C spine and right shoulder on 7/16/19. Right shoulder MRI showed subscapularis tendinosis and mild AC joint arthritis, no rotator cuff tear seen.      May 28, 2019 - She still has achiness in her hands, exercise is bothersome. Achiness is in her knees. In the past  it was more in the winter time but she has pain even now in this weather. If she go on for a walk her hips gets sore. Stiffness in the morning in hips, feet, ankles for about couple hours. She has recurrent UTI's since January 2019, She has seen Urologist who thinks it can be related to hormonal imbalance and intercourse.     February 26, 2019 - She has noted some improvement in her joints, they still feel stiff and sore in the morning. Right hurt more espcially to open things. She is on Plaquenil since Jan 9th, 2019. Denies any side effects with Plaquenil. No new skin rash pleurisy, oral sores etc.     History from initial visit : She has swelling in her right index finger since summer 2018, others fingers are mildly stiff as well. Wrists are sore. Left hand is slightly sore. Mostly in the morning, better as the day goes on. Once in a while she has pain in her ankles. She has back pain and muscle tightness. Can not do exercises by moving her shoulders over her head. If she sit too long she has pain in her hips. She has knee pain for 3 - 4 years.     She has fatigue for years. C/o dry eyes and has inflammation in her gums due to lichen Planus. She has recurrent sinus issues, nose feels congested. She has photosensitivity and burn easily in the sun.      She has seen Dr Ledezma in Rheumatology in 2015 for evaluation of lupus in the setting of Leukopenia. Her ADRIANNA, ACPA, RF, ESR were negative at the time and no diagnosis of autoimmune connective tissue was made. She has seen hematologist for leukopenia in the past and had bone marrow biopsy which was normal.   No history of psoriasis, ulcerative colitis or chron's disease. No h/o iritis, enthesitis, finger or toe swelling like dactylitis, plantar fascitis or heel pain. Denies any raynauds, malar rash, photosensitivity, recurrent mouth/genital ulcers, sicca symptoms, pleuritic chest pains, swallowing difficulty, hearing or visual changes recently.  No h/o  arterial/venous thrombosis in the past. Denies any tick bite, recent GI/ infection.             Review of Systems:   Review Of Systems  Constitutional: denies fever, chills, night sweats and weight loss.  Skin: No skin rash.  Eyes: + dryness or irritation in eyes. No episode of eye inflammation or redness.   Ears/Nose/Throat: no recurrent sinus infections.  Respiratory: No shortness of breath, dyspnea on exertion, cough, or hemoptysis  Cardiovascular: no chest pain or palpitations.  Gastrointestinal: no nausea, vomiting, abdominal pain.  Normal bowel movements.  Genitourinary: no dysuria, frequency  or hematuria.  Musculoskeletal: as in HPI  Neurologic: no numbness, tingling.  Psychiatric: no mood disorders.  Hematologic/Lymphatic/Immunologic: no history of easy bruising, petechia or purpura.  No abnormal bleeding.   Endocrine: + h/o thyroid disease or Diabetes.                  Past Medical History:     Past Medical History:   Diagnosis Date     Abnormal glandular Papanicolaou smear of cervix      Endometriosis      Hypothyroid      Inflammatory arthritis      PONV (postoperative nausea and vomiting)      Past Surgical History:   Procedure Laterality Date      SECTION       LAPAROSCOPY OPERATIVE ADULT  95,97,2001 rso,10 darlyn     LAPAROSCOPY,VAG HYSTERECTOMY      lso     LASER CO2 LAPAROSCOPY DIAGNOSTIC, LYSIS ADHESIONS, COMBINED N/A 2016    Procedure: COMBINED LASER CO2 LAPAROSCOPY DIAGNOSTIC, LYSIS ADHESIONS;  Surgeon: Zay Archuleta MD;  Location: [facility]            Social History:     Social History     Occupational History     Not on file   Tobacco Use     Smoking status: Never Smoker     Smokeless tobacco: Never Used   Substance and Sexual Activity     Alcohol use: Yes     Alcohol/week: 0.0 standard drinks     Comment: rarely     Drug use: No     Sexual activity: Yes     Partners: Male     Birth control/protection: Female Surgical     Comment: hysterectomy            Family History:  "    Family History   Problem Relation Age of Onset     Hyperlipidemia Father      Thyroid Disease Father             Allergies:     Allergies   Allergen Reactions     Penicillins Rash     Childhood rxn            Medications:     Current Outpatient Medications   Medication Sig Dispense Refill     Calcium-Magnesium-Zinc 333-133-5 MG TABS        fluticasone (FLONASE) 50 MCG/ACT nasal spray as needed  0     folic acid (FOLVITE) 1 MG tablet Take 2 tablets (2 mg) by mouth daily 90 tablet 3     guaiFENesin (MUCINEX) 600 MG 12 hr tablet Take by mouth as needed        hydroxychloroquine (PLAQUENIL) 200 MG tablet TAKE 2 TABLETS (400MG) BY  MOUTH ON MONDAY, WEDNESDAY, AND FRIDAY. TAKE 1 TABLET  (200MG) THE REST OF THE  OTHER DAYS 180 tablet 1     levothyroxine (SYNTHROID, LEVOTHROID) 75 MCG tablet Take 75 mcg by mouth daily        Multiple Vitamins-Minerals (MULTIVITAMIN ADULT PO)        nitroFURantoin macrocrystal-monohydrate (MACROBID) 100 MG capsule Take 100 mg by mouth 2 times daily as needed        Omega-3 Fatty Acids (FISH OIL PO) Take by mouth daily       Probiotic Product (PROBIOTIC ADVANCED PO)        estradiol (ESTRACE) 1 MG tablet Take 1 tablet (1 mg) by mouth daily (Patient not taking: Reported on 12/24/2018) 90 tablet 1     methotrexate 2.5 MG tablet TAKE 8 TABLETS BY MOUTH  EVERY 7 DAYS 96 tablet 0            Physical Exam:   Blood pressure 91/63, pulse 74, height 1.626 m (5' 4\"), weight 61 kg (134 lb 8 oz), SpO2 98 %, not currently breastfeeding.  Wt Readings from Last 4 Encounters:   12/02/19 61 kg (134 lb 8 oz)   08/28/19 62.4 kg (137 lb 8 oz)   05/28/19 63 kg (138 lb 12.8 oz)   02/26/19 63 kg (139 lb)       Constitutional: well-developed, appearing stated age; cooperative  Eyes: nl EOM, PERRLA, vision, conjunctiva, sclera  ENT: nl external ears, nose, hearing, lips, teeth, gums, throat  No mucous membrane lesions, normal saliva pool  Neck: no mass or thyroid enlargement  Resp: lungs clear to auscultation, nl " to palpation  CV: RRR, no murmurs, rubs or gallops, no edema  GI: no ABD mass or tenderness, no HSM  : not tested  Lymph: no cervical, supraclavicular, inguinal or epitrochlear nodes    MS: All TMJ, neck, shoulder, elbow, wrist, MCP/PIP/DIP, spine, hip, knee, ankle, and foot MTP/IP joints were examined.     -- No Tenderness over PIP joints, MCP joints, no synovitis.  No tenderness over elbows. Tenderness over bilateral ankles and MTP joints.  Normal range of motion of bilateral knees, hips and shoulders.    -- No dactylitis,  tenosynovitis, enthesopathy.    Skin: no nail pitting, alopecia, rash, nodules or lesions  Neuro: nl cranial nerves, strength, sensation, DTRs.   Psych: nl judgement, orientation, memory, affect.         Data:     Results for orders placed or performed in visit on 12/02/19   Erythrocyte sedimentation rate auto     Status: None   Result Value Ref Range    Sed Rate 4 0 - 20 mm/h   CRP inflammation     Status: None   Result Value Ref Range    CRP Inflammation <2.9 0.0 - 8.0 mg/L   Creatinine     Status: None   Result Value Ref Range    Creatinine 0.82 0.52 - 1.04 mg/dL    GFR Estimate 87 >60 mL/min/[1.73_m2]    GFR Estimate If Black >90 >60 mL/min/[1.73_m2]   CBC with platelets     Status: Abnormal   Result Value Ref Range    WBC 3.3 (L) 4.0 - 11.0 10e9/L    RBC Count 4.18 3.8 - 5.2 10e12/L    Hemoglobin 12.9 11.7 - 15.7 g/dL    Hematocrit 39.7 35.0 - 47.0 %    MCV 95 78 - 100 fl    MCH 30.9 26.5 - 33.0 pg    MCHC 32.5 31.5 - 36.5 g/dL    RDW 12.5 10.0 - 15.0 %    Platelet Count 193 150 - 450 10e9/L   ALT     Status: None   Result Value Ref Range    ALT 27 0 - 50 U/L   AST     Status: None   Result Value Ref Range    AST 27 0 - 45 U/L   Albumin level     Status: None   Result Value Ref Range    Albumin 3.8 3.4 - 5.0 g/dL       Recent Labs   Lab Test 01/17/17  1402 12/19/16  1621   HGB 14.1 13.7      No results for input(s): TSH, T4 in the last 86312 hours.  Hemoglobin   Date Value Ref Range  Status   12/02/2019 12.9 11.7 - 15.7 g/dL Final   09/30/2019 13.2 11.7 - 15.7 g/dL Final   08/28/2019 13.4 11.7 - 15.7 g/dL Final     Sed Rate   Date Value Ref Range Status   12/02/2019 4 0 - 20 mm/h Final   09/30/2019 4 0 - 20 mm/h Final   08/28/2019 5 0 - 20 mm/h Final     CRP Inflammation   Date Value Ref Range Status   12/02/2019 <2.9 0.0 - 8.0 mg/L Final   09/30/2019 <2.9 0.0 - 8.0 mg/L Final   08/28/2019 <2.9 0.0 - 8.0 mg/L Final     AST   Date Value Ref Range Status   12/02/2019 27 0 - 45 U/L Final   09/30/2019 23 0 - 45 U/L Final   08/28/2019 30 0 - 45 U/L Final     Albumin   Date Value Ref Range Status   12/02/2019 3.8 3.4 - 5.0 g/dL Final   09/30/2019 3.9 3.4 - 5.0 g/dL Final   08/28/2019 3.9 3.4 - 5.0 g/dL Final     ALT   Date Value Ref Range Status   12/02/2019 27 0 - 50 U/L Final   09/30/2019 26 0 - 50 U/L Final   08/28/2019 30 0 - 50 U/L Final     Rheumatoid Factor   Date Value Ref Range Status   12/24/2018 <20 <20 IU/mL Final     Recent Labs   Lab Test 12/02/19  0822 09/30/19  0842 08/28/19  0748   WBC 3.3* 2.9* 3.6*   HGB 12.9 13.2 13.4   HCT 39.7 40.0 40.4   MCV 95 94 94    192 206   AST 27 23 30   ALT 27 26 30       Outside studies reviewed:   ANTINUCLEAR ANTIBODY (ADRIANNA) Negative Negative [facility]     ADRIANNA TEST   Specimen   Blood - Blood     ADRIANNA TEST   Performing Organization Address City/State/Zipcode Phone Number   [facility]   [address]   [place] [address]      Back to top of Lab Results       CYCLIC CITRULLINE PEPTIDE  CYCLIC CITRULLINE PEPTIDE   Component Value Ref Range Performed At   CCP Antibody,IgG/IgA 25.6 (H) <=19.9 CU [facility]     CYCLIC CITRULLINE PEPTIDE   Specimen   Blood - Blood     CYCLIC CITRULLINE PEPTIDE   Narrative Performed At   Negative <20    Positive >=20    The following results were obtained with the [employer] QUANTA Flash CCP3 chemiluminescent  immunoassay. Values obtained with different manufacturers' assay methods may not be used interchangeably.       [facility]       CYCLIC CITRULLINE PEPTIDE   Performing Organization Address City/[facility] Phone Number   [facility]   [address]   [place] [address]      Back to top of Lab Results       LYME SCREEN W/REFLEX  LYME SCREEN W/REFLEX   Component Value Ref Range Performed At   LYME SCREEN W/REFLEX WEST BLOT NegativeComment: No detectable antibody; result does not exclude B. burgdorferi infection. An additional sample should be tested within 4-6 weeks if early infection is suspected. Negative [facility]     LYME SCREEN W/REFLEX   Specimen   Blood - Blood     LYME SCREEN W/REFLEX   Performing Organization Address City/[facility] Phone Number   [facility]   [address]   [place] [address]      Back to top of Lab Results       RA QUANTITATIVE  RA QUANTITATIVE   Component Value Ref Range Performed At   RHEUMATOID FACTOR,QUANT <15.9 <15.9 IU/mL [facility]     RA QUANTITATIVE   Specimen   Blood - Blood     RA QUANTITATIVE   Performing Organization Address City/[facility] Phone Number   [facility]   [address]   [place] [address]      Back to top of Lab Results       SEDIMENTATION RATE  SEDIMENTATION RATE   Component Value Ref Range Performed At   SEDIMENTATION RATE  3 <21 mm/hr [facility]     SEDIMENTATION RATE   Specimen   Blood - Blood     SEDIMENTATION RATE   Performing Organization Address City/[facility] Phone Number   [facility]   [address] [place] [address] [phone]     Back to top of Lab Results       C-REACTIVE PROTEIN  C-REACTIVE PROTEIN   Component Value Ref Range  Performed At   C-REACTIVE PROTEIN  0.06 <0.50 mg/dL [facility] LAB[facility]AT[facility]     C-REACTIVE PROTEIN   Specimen   Blood - Blood     C-REACTIVE PROTEIN   Performing Organization Address City/State/Zipcode Phone Number   [facility]   [address]           Clinical History: Finger sprain.        Procedure: XR FINGER 3 VIEWS RIGHT        Comparison: None.        Findings: No fracture or subluxation. There is normal joint spacing and alignment. No osseous lesion is identified. No soft tissue abnormality is evident.        Impression: Unremarkable right finger examination.     Reviewed Rheumatology lab flowsheet    Alejandro Menezes MD  [facility]  Department of Rheumatology & Autoimmune Disorders  [facility]: [phone]   Pager - [phone]

## 2019-12-02 NOTE — LETTER
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     Ms Harris is 43 yo female seen in clinic for evaluation of joint pains.     Seropositive Rheumatoid arthritis: She had pain in her joints mainly right index finger, wrists, knees, hips  summer 2018 along with stiffness. On first visit in 12/2018 she had tenderness over the right index finger PIP joint, right fifth PIP joint.  Mild tenderness present over the right wrist, right elbow.  Mild tenderness present over the left fifth PIP joint.  Tenderness present over bilateral ankles and MTP joints.  Normal range of motion of bilateral knees, hips and shoulders.    Her autoimmune work in 12/2018 showed normal RF, ACPA, ADRIANNA. But she had + ACPA in 9/2018. Her symptoms were consistent with seropositive Rheumatoid arthritis and was started on Plaquenil 300 mg daily dose in 1/2019.     She has noted improvement in her symptoms mainly swelling in her hands but still c/o achiness, denies any side effects with Plaquenil. Methotrexate added in 5/2019.  She has noticed improvement with combination of Plaquenil methotrexate.  Stiffness and joint pains have improved. Hair loss side effect with methotrexate have improved.      Right shoulder and neck pain : It has improved some but not totally better. She had injury to her shoulder in May 2018.  MRI of the right shoulder done on 7/20/2019 showed mild AC joint arthritis, subscapularis tendinosis, no rotator cuff tear, synovitis or effusion present.   According to her C-spine MRI showed mild disc bulge. She did PT.  If the condition will not improve she can see orthopedics specialist.    High risk medication use: Monitoring labs done today 12/2/19 showed normal AST, ALT, creatinine, white count of 3.3 otherwise normal CBC.  She had eye exam done for Plaquenil toxicity in March 2019 and it is normal OCT.     Plan    1.  Blood tests: CBC, AST/ALT,Cr, CRP every 8 - 12 weeks.     2. Continue Methotrexate 8 tab once a week   --q 3 mo AST/ALT, Albumin, CBC with plts  --Limit EtOH intake to 2 drinks weekly; use folate 1 mg daily.  --Tylenol 500 mg tid prn nausea/HA associated with dosing.    3. Continue Plaquenil 400 mg on M/W/F and 200 mg on rest of the days.     4.  Return to clinic in 6 months          Active Problem List:     Patient Active Problem List    Diagnosis Date Noted     Inflammatory arthritis      Priority: Medium     Pelvic pain in female 12/28/2015     Priority: Medium            History of Present Illness:   Lolita Harris is a 43 year old female with PMH of hypothyroidism, Leukopenia seen in the clinic in consultation at request of Murali Smith for evaluation of joint pains.     December 2, 2019 - She feels better with Plaquenil and Methotrexate combination, but it is not completely better. This weekend she has some extra pain in her hands. Knees bother her more in the winter. Hair loss has improved little bit. She had eye exam in 3/2019 and was normal. Denies any side effects with HCQ and Methotrexate.     August 28, 2019 - She has started Methotrexate in May, joint pains better with it. Stiffness has improved. She is on Plaquenil 300 mg + 6 tab of Methotrexate once a week. She has noticed hair loss with Methotrexate. She had eye exam in 3/14/19 and was normal. She had MRI of C spine and right shoulder on 7/16/19. Right shoulder MRI showed subscapularis tendinosis and mild AC joint arthritis, no rotator cuff tear seen.      May 28, 2019 - She  still has achiness in her hands, exercise is bothersome. Achiness is in her knees. In the past it was more in the winter time but she has pain even now in this weather. If she go on for a walk her hips gets sore. Stiffness in the morning in hips, feet, ankles for about couple hours. She has recurrent UTI's since January 2019, She has seen Urologist who thinks it can be related to hormonal imbalance and intercourse.     February 26, 2019 - She has noted some improvement in her joints, they still feel stiff and sore in the morning. Right hurt more espcially to open things. She is on Plaquenil since Jan 9th, 2019. Denies any side effects with Plaquenil. No new skin rash pleurisy, oral sores etc.     History from initial visit : She has swelling in her right index finger since summer 2018, others fingers are mildly stiff as well. Wrists are sore. Left hand is slightly sore. Mostly in the morning, better as the day goes on. Once in a while she has pain in her ankles. She has back pain and muscle tightness. Can not do exercises by moving her shoulders over her head. If she sit too long she has pain in her hips. She has knee pain for 3 - 4 years.     She has fatigue for years. C/o dry eyes and has inflammation in her gums due to lichen Planus. She has recurrent sinus issues, nose feels congested. She has photosensitivity and burn easily in the sun.      She has seen Dr Ledezma in Rheumatology in 2015 for evaluation of lupus in the setting of Leukopenia. Her ADRIANNA, ACPA, RF, ESR were negative at the time and no diagnosis of autoimmune connective tissue was made. She has seen hematologist for leukopenia in the past and had bone marrow biopsy which was normal.   No history of psoriasis, ulcerative colitis or chron's disease. No h/o iritis, enthesitis, finger or toe swelling like dactylitis, plantar fascitis or heel pain. Denies any raynauds, malar rash, photosensitivity, recurrent mouth/genital ulcers, sicca symptoms, pleuritic  chest pains, swallowing difficulty, hearing or visual changes recently.  No h/o arterial/venous thrombosis in the past. Denies any tick bite, recent GI/ infection.             Review of Systems:   Review Of Systems  Constitutional: denies fever, chills, night sweats and weight loss.  Skin: No skin rash.  Eyes: + dryness or irritation in eyes. No episode of eye inflammation or redness.   Ears/Nose/Throat: no recurrent sinus infections.  Respiratory: No shortness of breath, dyspnea on exertion, cough, or hemoptysis  Cardiovascular: no chest pain or palpitations.  Gastrointestinal: no nausea, vomiting, abdominal pain.  Normal bowel movements.  Genitourinary: no dysuria, frequency  or hematuria.  Musculoskeletal: as in HPI  Neurologic: no numbness, tingling.  Psychiatric: no mood disorders.  Hematologic/Lymphatic/Immunologic: no history of easy bruising, petechia or purpura.  No abnormal bleeding.   Endocrine: + h/o thyroid disease or Diabetes.                  Past Medical History:     Past Medical History:   Diagnosis Date     Abnormal glandular Papanicolaou smear of cervix      Endometriosis      Hypothyroid      Inflammatory arthritis      PONV (postoperative nausea and vomiting)      Past Surgical History:   Procedure Laterality Date      SECTION       LAPAROSCOPY OPERATIVE ADULT  95,97,2001 rso,10 darlyn     LAPAROSCOPY,VAG HYSTERECTOMY  2008    lso     LASER CO2 LAPAROSCOPY DIAGNOSTIC, LYSIS ADHESIONS, COMBINED N/A 2016    Procedure: COMBINED LASER CO2 LAPAROSCOPY DIAGNOSTIC, LYSIS ADHESIONS;  Surgeon: Zay Archuleta MD;  [facility]: [facility]            Social History:     Social History     Occupational History     Not on file   Tobacco Use     Smoking status: Never Smoker     Smokeless tobacco: Never Used   Substance and Sexual Activity     Alcohol use: Yes     Alcohol/week: 0.0 standard drinks     Comment: rarely     Drug use: No     Sexual activity: Yes     Partners: Male     Birth  "control/protection: Female Surgical     Comment: hysterectomy            Family History:     Family History   Problem Relation Age of Onset     Hyperlipidemia Father      Thyroid Disease Father             Allergies:     Allergies   Allergen Reactions     Penicillins Rash     Childhood rxn            Medications:     Current Outpatient Medications   Medication Sig Dispense Refill     Calcium-Magnesium-Zinc 333-133-5 MG TABS        fluticasone (FLONASE) 50 MCG/ACT nasal spray as needed  0     folic acid (FOLVITE) 1 MG tablet Take 2 tablets (2 mg) by mouth daily 90 tablet 3     guaiFENesin (MUCINEX) 600 MG 12 hr tablet Take by mouth as needed        hydroxychloroquine (PLAQUENIL) 200 MG tablet TAKE 2 TABLETS (400MG) BY  MOUTH ON MONDAY, WEDNESDAY, AND FRIDAY. TAKE 1 TABLET  (200MG) THE REST OF THE  OTHER DAYS 180 tablet 1     levothyroxine (SYNTHROID, LEVOTHROID) 75 MCG tablet Take 75 mcg by mouth daily        Multiple Vitamins-Minerals (MULTIVITAMIN ADULT PO)        nitroFURantoin macrocrystal-monohydrate (MACROBID) 100 MG capsule Take 100 mg by mouth 2 times daily as needed        Omega-3 Fatty Acids (FISH OIL PO) Take by mouth daily       Probiotic Product (PROBIOTIC ADVANCED PO)        estradiol (ESTRACE) 1 MG tablet Take 1 tablet (1 mg) by mouth daily (Patient not taking: Reported on 12/24/2018) 90 tablet 1     methotrexate 2.5 MG tablet TAKE 8 TABLETS BY MOUTH  EVERY 7 DAYS 96 tablet 0            Physical Exam:   Blood pressure 91/63, pulse 74, height 1.626 m (5' 4\"), weight 61 kg (134 lb 8 oz), SpO2 98 %, not currently breastfeeding.  Wt Readings from Last 4 Encounters:   12/02/19 61 kg (134 lb 8 oz)   08/28/19 62.4 kg (137 lb 8 oz)   05/28/19 63 kg (138 lb 12.8 oz)   02/26/19 63 kg (139 lb)       Constitutional: well-developed, appearing stated age; cooperative  Eyes: nl EOM, PERRLA, vision, conjunctiva, sclera  ENT: nl external ears, nose, hearing, lips, teeth, gums, throat  No mucous membrane lesions, normal " saliva pool  Neck: no mass or thyroid enlargement  Resp: lungs clear to auscultation, nl to palpation  CV: RRR, no murmurs, rubs or gallops, no edema  GI: no ABD mass or tenderness, no HSM  : not tested  Lymph: no cervical, supraclavicular, inguinal or epitrochlear nodes    MS: All TMJ, neck, shoulder, elbow, wrist, MCP/PIP/DIP, spine, hip, knee, ankle, and foot MTP/IP joints were examined.     -- No Tenderness over PIP joints, MCP joints, no synovitis.  No tenderness over elbows. Tenderness over bilateral ankles and MTP joints.  Normal range of motion of bilateral knees, hips and shoulders.    -- No dactylitis,  tenosynovitis, enthesopathy.    Skin: no nail pitting, alopecia, rash, nodules or lesions  Neuro: nl cranial nerves, strength, sensation, DTRs.   Psych: nl judgement, orientation, memory, affect.         Data:     Results for orders placed or performed in visit on 12/02/19   Erythrocyte sedimentation rate auto     Status: None   Result Value Ref Range    Sed Rate 4 0 - 20 mm/h   CRP inflammation     Status: None   Result Value Ref Range    CRP Inflammation <2.9 0.0 - 8.0 mg/L   Creatinine     Status: None   Result Value Ref Range    Creatinine 0.82 0.52 - 1.04 mg/dL    GFR Estimate 87 >60 mL/min/[1.73_m2]    GFR Estimate If Black >90 >60 mL/min/[1.73_m2]   CBC with platelets     Status: Abnormal   Result Value Ref Range    WBC 3.3 (L) 4.0 - 11.0 10e9/L    RBC Count 4.18 3.8 - 5.2 10e12/L    Hemoglobin 12.9 11.7 - 15.7 g/dL    Hematocrit 39.7 35.0 - 47.0 %    MCV 95 78 - 100 fl    MCH 30.9 26.5 - 33.0 pg    MCHC 32.5 31.5 - 36.5 g/dL    RDW 12.5 10.0 - 15.0 %    Platelet Count 193 150 - 450 10e9/L   ALT     Status: None   Result Value Ref Range    ALT 27 0 - 50 U/L   AST     Status: None   Result Value Ref Range    AST 27 0 - 45 U/L   Albumin level     Status: None   Result Value Ref Range    Albumin 3.8 3.4 - 5.0 g/dL       Recent Labs   Lab Test 01/17/17  1402 12/19/16  1621   HGB 14.1 13.7      No  results for input(s): TSH, T4 in the last 59352 hours.  Hemoglobin   Date Value Ref Range Status   12/02/2019 12.9 11.7 - 15.7 g/dL Final   09/30/2019 13.2 11.7 - 15.7 g/dL Final   08/28/2019 13.4 11.7 - 15.7 g/dL Final     Sed Rate   Date Value Ref Range Status   12/02/2019 4 0 - 20 mm/h Final   09/30/2019 4 0 - 20 mm/h Final   08/28/2019 5 0 - 20 mm/h Final     CRP Inflammation   Date Value Ref Range Status   12/02/2019 <2.9 0.0 - 8.0 mg/L Final   09/30/2019 <2.9 0.0 - 8.0 mg/L Final   08/28/2019 <2.9 0.0 - 8.0 mg/L Final     AST   Date Value Ref Range Status   12/02/2019 27 0 - 45 U/L Final   09/30/2019 23 0 - 45 U/L Final   08/28/2019 30 0 - 45 U/L Final     Albumin   Date Value Ref Range Status   12/02/2019 3.8 3.4 - 5.0 g/dL Final   09/30/2019 3.9 3.4 - 5.0 g/dL Final   08/28/2019 3.9 3.4 - 5.0 g/dL Final     ALT   Date Value Ref Range Status   12/02/2019 27 0 - 50 U/L Final   09/30/2019 26 0 - 50 U/L Final   08/28/2019 30 0 - 50 U/L Final     Rheumatoid Factor   Date Value Ref Range Status   12/24/2018 <20 <20 IU/mL Final     Recent Labs   Lab Test 12/02/19  0822 09/30/19  0842 08/28/19  0748   WBC 3.3* 2.9* 3.6*   HGB 12.9 13.2 13.4   HCT 39.7 40.0 40.4   MCV 95 94 94    192 206   AST 27 23 30   ALT 27 26 30       Outside studies reviewed:   ANTINUCLEAR ANTIBODY (ADRIANNA) Negative Negative [facility] LABORATORY     ADRIANNA TEST   Specimen   Blood - Blood     ADRIANNA TEST   Performing Organization Address City/State/Zipcode Phone Number   [facility] LABORATORY-[facility]EN[facility]AL LABORATORY   [address]   [place] [address]      Back to top of Lab Results       CYCLIC CITRULLINE PEPTIDE  CYCLIC CITRULLINE PEPTIDE   Component Value Ref Range Performed At   CCP Antibody,IgG/IgA 25.6 (H) <=19.9 CU [facility]AT[facility] LABORATORY     CYCLIC CITRULLINE PEPTIDE   Specimen   Blood - Blood     CYCLIC CITRULLINE PEPTIDE   Narrative Performed At   Negative <20    Positive >=20     The following results were obtained with the [employer] QUANTA Flash CCP3 chemiluminescent immunoassay. Values obtained with different manufacturers' assay methods may not be used interchangeably.       [facility] LABORATORY       CYCLIC CITRULLINE PEPTIDE   Performing Organization Address City/[facility] Phone Number   [facility]   [address]   [place] [address]      Back to top of Lab Results       LYME SCREEN W/REFLEX  LYME SCREEN W/REFLEX   Component Value Ref Range Performed At   LYME SCREEN W/REFLEX WEST BLOT NegativeComment: No detectable antibody; result does not exclude B. burgdorferi infection. An additional sample should be tested within 4-6 weeks if early infection is suspected. Negative [facility] LABORATORY     LYME SCREEN W/REFLEX   Specimen   Blood - Blood     LYME SCREEN W/REFLEX   Performing Organization Address City/[facility] Phone Number   [facility]   [address]   [place] [address]      Back to top of Lab Results       RA QUANTITATIVE  RA QUANTITATIVE   Component Value Ref Range Performed At   RHEUMATOID FACTOR,QUANT <15.9 <15.9 IU/mL [facility] LABORATORY     RA QUANTITATIVE   Specimen   Blood - Blood     RA QUANTITATIVE   Performing Organization Address City/[facility] Phone Number   [facility]   [address]   [place] [address]      Back to top of Lab Results       SEDIMENTATION RATE  SEDIMENTATION RATE   Component Value Ref Range Performed At   SEDIMENTATION RATE  3 <21 mm/hr [facility]     SEDIMENTATION RATE   Specimen   Blood - Blood     SEDIMENTATION RATE   Performing Organization Address City/[facility]/Zipcode Phone Number   [facility]   [address] [place] [address] [phone]     Back to top of Lab  Results       C-REACTIVE PROTEIN  C-REACTIVE PROTEIN   Component Value Ref Range Performed At   C-REACTIVE PROTEIN  0.06 <0.50 mg/dL [facility] LABORATORY-CENTRAL LABORATORY     C-REACTIVE PROTEIN   Specimen   Blood - Blood     C-REACTIVE PROTEIN   Performing Organization Address City/State/Zipcode Phone Number   [facility] LABORATORY-CENTRAL LABORATORY   [address]           Clinical History: Finger sprain.        Procedure: XR FINGER 3 VIEWS RIGHT        Comparison: None.        Findings: No fracture or subluxation. There is normal joint spacing and alignment. No osseous lesion is identified. No soft tissue abnormality is evident.        Impression: Unremarkable right finger examination.     Reviewed Rheumatology lab flowsheet    Alejandro Menezes MD  [facility]  Department of Rheumatology & Autoimmune Disorders  [facility]: [phone]   Pager - [phone]        Alejandro Menezes MD

## 2019-12-02 NOTE — NURSING NOTE
"Lolita Harris's goals for this visit include: Return for RA  She requests these members of her care team be copied on today's visit information:     PCP: No Ref-Primary, Physician    Referring Provider:  No referring provider defined for this encounter.    BP 91/63   Pulse 74   Ht 1.626 m (5' 4\")   Wt 61 kg (134 lb 8 oz)   SpO2 98%   BMI 23.09 kg/m     "

## 2019-12-02 NOTE — RESULT ENCOUNTER NOTE
Results discussed with the patient at the time of visit.     Alejandro Menezes MD   12/2/2019  8:43 AM

## 2019-12-04 ENCOUNTER — TELEPHONE (OUTPATIENT)
Dept: RHEUMATOLOGY | Facility: CLINIC | Age: 44
End: 2019-12-04

## 2019-12-04 NOTE — TELEPHONE ENCOUNTER
[facility] Call Center    Phone Message    May a detailed message be left on voicemail: yes    Found redness and blister on gums - may be due to an auto immune disorder -the dentist office requested images be sent over to rheum from [facility] in [place] and to discuss the dental appt with Clif. Patient did just see provider/rheum yesterday, but needs to go over this new information now. Not urgent, but want to make sure doesn't fall off the radar.      Action Taken: Message routed to:  Adult Clinics: Rheumatology p [phone]

## 2019-12-05 NOTE — TELEPHONE ENCOUNTER
Patient was seen at the dentist at the yesterday for a normal cleaning. Patient's gums had been feeling swollen and painful and she thought it was due to a tooth. When they examined her mouth they noticed that her gums are really red and raw, found a blood blister spot on her gums. They think it may be autoimmune spot on her gums. They thought it might be lichen planus, pemphigoid or geographic tongue.     They will be sending over the notes and the pictures.     Will route to Dr. Menezes to review.     Mele Brooks RN   Pulmonary/CORE Care Coordinator  [facility]

## 2019-12-09 NOTE — TELEPHONE ENCOUNTER
Called patient and gave her Dr. Menezes's recommendations. She expressed understanding and had no further questions.     Marlen Crisostomo BSN, RN   Rheumatology Care Coordinator   [facility]

## 2019-12-09 NOTE — TELEPHONE ENCOUNTER
Thanks for the update. No change in the management from Rheumatology perspective. She will need to follow up with Dentist. If it worsens then can be seen by Dermatology.

## 2020-01-05 DIAGNOSIS — M06.9 RHEUMATOID ARTHRITIS INVOLVING MULTIPLE SITES, UNSPECIFIED RHEUMATOID FACTOR PRESENCE: ICD-10-CM

## 2020-01-07 RX ORDER — FOLIC ACID 1 MG/1
2 TABLET ORAL DAILY
Qty: 180 TABLET | Refills: 1 | Status: SHIPPED | OUTPATIENT
Start: 2020-01-07 | End: 2020-06-02

## 2020-01-07 NOTE — TELEPHONE ENCOUNTER
folic acid (FOLVITE) 1 MG tablet      Last Written Prescription Date:  8/28/19  Last Fill Quantity: 90,   # refills: 3  Last Office Visit : 12/2/19  Future Office visit:  6/2/20    Refill to pharmacy.

## 2020-02-25 DIAGNOSIS — M06.9 RHEUMATOID ARTHRITIS INVOLVING MULTIPLE SITES, UNSPECIFIED RHEUMATOID FACTOR PRESENCE: ICD-10-CM

## 2020-02-25 NOTE — TELEPHONE ENCOUNTER
methotrexate 2.5 MG tablet      Last Written Prescription Date:  12-2-19  Last Fill Quantity: 96,   # refills: 0  Last Office Visit: 12-2-19  Future Office visit:  6-2-2020    CBC RESULTS:   Recent Labs   Lab Test 12/02/19  0822   WBC 3.3*   RBC 4.18   HGB 12.9   HCT 39.7   MCV 95   MCH 30.9   MCHC 32.5   RDW 12.5          Creatinine   Date Value Ref Range Status   12/02/2019 0.82 0.52 - 1.04 mg/dL Final   ]    Liver Function Studies -   Recent Labs   Lab Test 12/02/19  0822   ALBUMIN 3.8   AST 27   ALT 27       Kathleen M Doege RN

## 2020-03-02 DIAGNOSIS — M06.9 RHEUMATOID ARTHRITIS INVOLVING MULTIPLE SITES, UNSPECIFIED RHEUMATOID FACTOR PRESENCE: ICD-10-CM

## 2020-03-02 DIAGNOSIS — Z79.899 HIGH RISK MEDICATION USE: ICD-10-CM

## 2020-03-02 LAB
ALBUMIN SERPL-MCNC: 3.7 G/DL (ref 3.4–5)
ALT SERPL W P-5'-P-CCNC: 29 U/L (ref 0–50)
AST SERPL W P-5'-P-CCNC: 22 U/L (ref 0–45)
CREAT SERPL-MCNC: 0.8 MG/DL (ref 0.52–1.04)
CRP SERPL-MCNC: <2.9 MG/L (ref 0–8)
ERYTHROCYTE [DISTWIDTH] IN BLOOD BY AUTOMATED COUNT: 12.8 % (ref 10–15)
ERYTHROCYTE [SEDIMENTATION RATE] IN BLOOD BY WESTERGREN METHOD: 4 MM/H (ref 0–20)
GFR SERPL CREATININE-BSD FRML MDRD: 89 ML/MIN/{1.73_M2}
HCT VFR BLD AUTO: 42.1 % (ref 35–47)
HGB BLD-MCNC: 13.3 G/DL (ref 11.7–15.7)
MCH RBC QN AUTO: 31.1 PG (ref 26.5–33)
MCHC RBC AUTO-ENTMCNC: 31.6 G/DL (ref 31.5–36.5)
MCV RBC AUTO: 99 FL (ref 78–100)
PLATELET # BLD AUTO: 199 10E9/L (ref 150–450)
RBC # BLD AUTO: 4.27 10E12/L (ref 3.8–5.2)
WBC # BLD AUTO: 3.3 10E9/L (ref 4–11)

## 2020-03-02 PROCEDURE — 85027 COMPLETE CBC AUTOMATED: CPT | Performed by: STUDENT IN AN ORGANIZED HEALTH CARE EDUCATION/TRAINING PROGRAM

## 2020-03-02 PROCEDURE — 84450 TRANSFERASE (AST) (SGOT): CPT | Performed by: STUDENT IN AN ORGANIZED HEALTH CARE EDUCATION/TRAINING PROGRAM

## 2020-03-02 PROCEDURE — 84460 ALANINE AMINO (ALT) (SGPT): CPT | Performed by: STUDENT IN AN ORGANIZED HEALTH CARE EDUCATION/TRAINING PROGRAM

## 2020-03-02 PROCEDURE — 86140 C-REACTIVE PROTEIN: CPT | Performed by: STUDENT IN AN ORGANIZED HEALTH CARE EDUCATION/TRAINING PROGRAM

## 2020-03-02 PROCEDURE — 82565 ASSAY OF CREATININE: CPT | Performed by: STUDENT IN AN ORGANIZED HEALTH CARE EDUCATION/TRAINING PROGRAM

## 2020-03-02 PROCEDURE — 36415 COLL VENOUS BLD VENIPUNCTURE: CPT | Performed by: STUDENT IN AN ORGANIZED HEALTH CARE EDUCATION/TRAINING PROGRAM

## 2020-03-02 PROCEDURE — 82040 ASSAY OF SERUM ALBUMIN: CPT | Performed by: STUDENT IN AN ORGANIZED HEALTH CARE EDUCATION/TRAINING PROGRAM

## 2020-03-02 PROCEDURE — 85652 RBC SED RATE AUTOMATED: CPT | Performed by: STUDENT IN AN ORGANIZED HEALTH CARE EDUCATION/TRAINING PROGRAM

## 2020-05-21 ENCOUNTER — TELEPHONE (OUTPATIENT)
Dept: RHEUMATOLOGY | Facility: CLINIC | Age: 45
End: 2020-05-21

## 2020-05-21 NOTE — TELEPHONE ENCOUNTER
Prior Authorization Retail Medication Request    Medication/Dose: hydroxychloroquine (PLAQUENIL) 200 MG tablet     ICD code (if different than what is on RX):  M06.09    Previously Tried and Failed:    Rationale:      Insurance Name:    Insurance ID:        Pharmacy Information (if different than what is on RX)  Name:    Phone:

## 2020-05-21 NOTE — TELEPHONE ENCOUNTER
PA Initiation    Medication: hydroxychloroquine (PLAQUENIL) 200 MG tablet - INITIATED   Insurance Company: [id number] ([facility]) - Phone [phone] Fax [phone]  Pharmacy Filling the Rx: [facility] - [address]  Filling Pharmacy Phone: [phone]  Filling Pharmacy Fax:    Start Date: 5/21/2020

## 2020-05-28 DIAGNOSIS — M06.9 RHEUMATOID ARTHRITIS INVOLVING MULTIPLE SITES, UNSPECIFIED RHEUMATOID FACTOR PRESENCE: ICD-10-CM

## 2020-06-01 NOTE — TELEPHONE ENCOUNTER
methotrexate 2.5 MG tablet      Last Written Prescription Date:  3-5-2020  Last Fill Quantity: 96,   # refills: 0  Last Office Visit: 12-2-19  Future Office visit:  6-2-2020    CBC RESULTS:   Recent Labs   Lab Test 03/02/20  0802   WBC 3.3*   RBC 4.27   HGB 13.3   HCT 42.1   MCV 99   MCH 31.1   MCHC 31.6   RDW 12.8          Creatinine   Date Value Ref Range Status   03/02/2020 0.80 0.52 - 1.04 mg/dL Final   ]    Liver Function Studies -   Recent Labs   Lab Test 03/02/20  0802   ALBUMIN 3.7   AST 22   ALT 29       Kathleen M Doege RN

## 2020-06-02 ENCOUNTER — VIRTUAL VISIT (OUTPATIENT)
Dept: RHEUMATOLOGY | Facility: CLINIC | Age: 45
End: 2020-06-02
Payer: COMMERCIAL

## 2020-06-02 DIAGNOSIS — M06.09 SERONEGATIVE RHEUMATOID ARTHRITIS OF MULTIPLE SITES (H): ICD-10-CM

## 2020-06-02 DIAGNOSIS — M06.9 RHEUMATOID ARTHRITIS INVOLVING MULTIPLE SITES, UNSPECIFIED RHEUMATOID FACTOR PRESENCE: ICD-10-CM

## 2020-06-02 PROCEDURE — 99214 OFFICE O/P EST MOD 30 MIN: CPT | Mod: GT | Performed by: STUDENT IN AN ORGANIZED HEALTH CARE EDUCATION/TRAINING PROGRAM

## 2020-06-02 RX ORDER — PREDNISONE 5 MG/1
TABLET ORAL
Qty: 100 TABLET | Refills: 2 | Status: SHIPPED | OUTPATIENT
Start: 2020-06-02 | End: 2020-06-09

## 2020-06-02 RX ORDER — HYDROXYCHLOROQUINE SULFATE 200 MG/1
TABLET, FILM COATED ORAL
Qty: 180 TABLET | Refills: 1 | Status: SHIPPED | OUTPATIENT
Start: 2020-06-02 | End: 2020-07-31

## 2020-06-02 RX ORDER — FOLIC ACID 1 MG/1
3 TABLET ORAL DAILY
Qty: 200 TABLET | Refills: 1 | Status: SHIPPED | OUTPATIENT
Start: 2020-06-02 | End: 2020-08-17

## 2020-06-02 NOTE — PROGRESS NOTES
"Lolita Harris is a 45 year old female who is being evaluated via a billable video visit.      The patient has been notified of following:     \"This video visit will be conducted via a call between you and your physician/provider. We have found that certain health care needs can be provided without the need for an in-person physical exam.  This service lets us provide the care you need with a video conversation.  If a prescription is necessary we can send it directly to your pharmacy.  If lab work is needed we can place an order for that and you can then stop by our lab to have the test done at a later time.    Video visits are billed at different rates depending on your insurance coverage.  Please reach out to your insurance provider with any questions.    If during the course of the call the physician/provider feels a video visit is not appropriate, you will not be charged for this service.\"    Patient has given verbal consent for Video visit? Yes    How would you like to obtain your AVS? Blaze    Patient would like the video invitation sent by: Text to cell phone: [phone]    Will anyone else be joining your video visit? No      Nury Gamino [facility]    Subjective: Lolita is a 45-year-old female with past medical history of seropositive rheumatoid arthritis, cyclical neutropenia, hypothyroidism evaluated via virtual visit for management of joint pains.  She had a positive anti-CCP antibody of 25.6 in September 2018 but since then her rheumatoid serologies have has been negative.  She is on Plaquenil 400 mg daily since January 2019.  Methotrexate was added in May 2019.  At present she is on 8 tablets once a week.  Has noticed some hair loss with methotrexate.  Her right shoulder pain is better.  She reports swelling on her fingers.  Ring finger of the left hand is swollen for the last couple months.  Cannot take her ring off.  She has reduced ability to .  Other joints in her fingers are sore as well.  " Denies any pain in her wrists.  Toes are sore to touch.  Because of pain she has trouble going to sleep and staying asleep.  She has noted some sores in her mouth which are red spots biopsy showed lichen planus.    Review of systems negative except for those mentioned above    Reviewed her past medical, surgical, family history    Physical exam: Mild swelling noted over her fingers.  Tenderness present over MCP, PIP joints.  She has difficulty making complete fist.  Reports tenderness over MTP joints and bilateral ankles.    Pertinent labs:   Component      Latest Ref Rng & Units 3/2/2020   WBC      4.0 - 11.0 10e9/L 3.3 (L)   RBC Count      3.8 - 5.2 10e12/L 4.27   Hemoglobin      11.7 - 15.7 g/dL 13.3   Hematocrit      35.0 - 47.0 % 42.1   MCV      78 - 100 fl 99   MCH      26.5 - 33.0 pg 31.1   MCHC      31.5 - 36.5 g/dL 31.6   RDW      10.0 - 15.0 % 12.8   Platelet Count      150 - 450 10e9/L 199   Creatinine      0.52 - 1.04 mg/dL 0.80   GFR Estimate      >60 mL/min/1.73:m2 89   GFR Estimate If Black      >60 mL/min/1.73:m2 >90   Sed Rate      0 - 20 mm/h 4   CRP Inflammation      0.0 - 8.0 mg/L <2.9   ALT      0 - 50 U/L 29   AST      0 - 45 U/L 22   Albumin      3.4 - 5.0 g/dL 3.7     Assessment    Seropositive rheumatoid arthritis  Leukopenia  Hypothyroidism    Seropositive rheumatoid arthritis: She is on methotrexate 20 mg once a week and Plaquenil 300 mg daily.  She has noticed more swelling and stiffness in her hands and feet.  I discussed regarding increasing the methotrexate to the maximum dose of 25 mg once a week.  She can divide the methotrexate dose to 5 tablets in the morning 5 in the evening once a week.  Will also give her a short prednisone taper starting with 20 mg and tapering down by 5 mg every 5 days to the lowest effective dose.    Will get methotrexate monitoring labs in a month.    If her symptoms will not improve with the combination or she experience side effects with higher doses of  methotrexate, then will consider addition of a biologic medication.    Plan     Increase methotrexate to 9 tablets and then to 10 tablets once a week.  She can divide the dose of methotrexate to 5 tablets in the morning and 5 in the evening once a week.    Increase folic acid to 3 tablets once a week    Continue Plaquenil 2 tablets on Monday Wednesday Friday and 1 tablet rest of the days    Short prednisone taper starting with 20 mg and tapering down by 5 mg every 5 days.    Methotrexate monitoring labs in a month    Follow-up in 2 months    Video-Visit Details    Type of service:  Video Visit    Video Start Time: 9:40 AM   Video End Time: 10:00 AM  Originating Location (pt. Location): Home    Distant Location (provider location):  [facility]     Platform used for Video Visit: Gay Menezes MD

## 2020-06-02 NOTE — PATIENT INSTRUCTIONS
Increase methotrexate to 9 tablets and then to 10 tablets once a week.  She can divide the dose of methotrexate to 5 tablets in the morning and 5 in the evening once a week.    Increase folic acid to 3 tablets once a week    Continue Plaquenil    Short prednisone taper starting with 20 mg and tapering down by 5 mg every 5 days.    Methotrexate monitoring labs in a month    Follow-up in 2 months

## 2020-07-16 DIAGNOSIS — M06.9 RHEUMATOID ARTHRITIS INVOLVING MULTIPLE SITES, UNSPECIFIED RHEUMATOID FACTOR PRESENCE: ICD-10-CM

## 2020-07-16 DIAGNOSIS — M06.09 SERONEGATIVE RHEUMATOID ARTHRITIS OF MULTIPLE SITES (H): ICD-10-CM

## 2020-07-16 LAB
ALBUMIN SERPL-MCNC: 3.9 G/DL (ref 3.4–5)
ALT SERPL W P-5'-P-CCNC: 32 U/L (ref 0–50)
AST SERPL W P-5'-P-CCNC: 24 U/L (ref 0–45)
CREAT SERPL-MCNC: 0.84 MG/DL (ref 0.52–1.04)
CRP SERPL-MCNC: <2.9 MG/L (ref 0–8)
ERYTHROCYTE [DISTWIDTH] IN BLOOD BY AUTOMATED COUNT: 12.8 % (ref 10–15)
ERYTHROCYTE [SEDIMENTATION RATE] IN BLOOD BY WESTERGREN METHOD: 5 MM/H (ref 0–20)
GFR SERPL CREATININE-BSD FRML MDRD: 84 ML/MIN/{1.73_M2}
HCT VFR BLD AUTO: 43.5 % (ref 35–47)
HGB BLD-MCNC: 14.7 G/DL (ref 11.7–15.7)
MCH RBC QN AUTO: 32 PG (ref 26.5–33)
MCHC RBC AUTO-ENTMCNC: 33.8 G/DL (ref 31.5–36.5)
MCV RBC AUTO: 95 FL (ref 78–100)
PLATELET # BLD AUTO: 215 10E9/L (ref 150–450)
RBC # BLD AUTO: 4.6 10E12/L (ref 3.8–5.2)
WBC # BLD AUTO: 3.6 10E9/L (ref 4–11)

## 2020-07-16 PROCEDURE — 36415 COLL VENOUS BLD VENIPUNCTURE: CPT | Performed by: STUDENT IN AN ORGANIZED HEALTH CARE EDUCATION/TRAINING PROGRAM

## 2020-07-16 PROCEDURE — 84450 TRANSFERASE (AST) (SGOT): CPT | Performed by: STUDENT IN AN ORGANIZED HEALTH CARE EDUCATION/TRAINING PROGRAM

## 2020-07-16 PROCEDURE — 86200 CCP ANTIBODY: CPT | Performed by: STUDENT IN AN ORGANIZED HEALTH CARE EDUCATION/TRAINING PROGRAM

## 2020-07-16 PROCEDURE — 86431 RHEUMATOID FACTOR QUANT: CPT | Performed by: STUDENT IN AN ORGANIZED HEALTH CARE EDUCATION/TRAINING PROGRAM

## 2020-07-16 PROCEDURE — 82040 ASSAY OF SERUM ALBUMIN: CPT | Performed by: STUDENT IN AN ORGANIZED HEALTH CARE EDUCATION/TRAINING PROGRAM

## 2020-07-16 PROCEDURE — 85652 RBC SED RATE AUTOMATED: CPT | Performed by: STUDENT IN AN ORGANIZED HEALTH CARE EDUCATION/TRAINING PROGRAM

## 2020-07-16 PROCEDURE — 85027 COMPLETE CBC AUTOMATED: CPT | Performed by: STUDENT IN AN ORGANIZED HEALTH CARE EDUCATION/TRAINING PROGRAM

## 2020-07-16 PROCEDURE — 82565 ASSAY OF CREATININE: CPT | Performed by: STUDENT IN AN ORGANIZED HEALTH CARE EDUCATION/TRAINING PROGRAM

## 2020-07-16 PROCEDURE — 86140 C-REACTIVE PROTEIN: CPT | Performed by: STUDENT IN AN ORGANIZED HEALTH CARE EDUCATION/TRAINING PROGRAM

## 2020-07-16 PROCEDURE — 84460 ALANINE AMINO (ALT) (SGPT): CPT | Performed by: STUDENT IN AN ORGANIZED HEALTH CARE EDUCATION/TRAINING PROGRAM

## 2020-07-17 LAB
CCP AB SER IA-ACNC: 1 U/ML
RHEUMATOID FACT SER NEPH-ACNC: <7 IU/ML (ref 0–20)

## 2020-07-27 DIAGNOSIS — M06.9 RHEUMATOID ARTHRITIS INVOLVING MULTIPLE SITES, UNSPECIFIED RHEUMATOID FACTOR PRESENCE: ICD-10-CM

## 2020-07-30 RX ORDER — FOLIC ACID 1 MG/1
TABLET ORAL
Qty: 270 TABLET | Refills: 3 | OUTPATIENT
Start: 2020-07-30

## 2020-07-31 ENCOUNTER — VIRTUAL VISIT (OUTPATIENT)
Dept: RHEUMATOLOGY | Facility: CLINIC | Age: 45
End: 2020-07-31
Payer: COMMERCIAL

## 2020-07-31 ENCOUNTER — TELEPHONE (OUTPATIENT)
Dept: RHEUMATOLOGY | Facility: CLINIC | Age: 45
End: 2020-07-31

## 2020-07-31 ENCOUNTER — CARE COORDINATION (OUTPATIENT)
Dept: RHEUMATOLOGY | Facility: CLINIC | Age: 45
End: 2020-07-31

## 2020-07-31 DIAGNOSIS — M05.79 RHEUMATOID ARTHRITIS, SEROPOSITIVE, MULTIPLE SITES (H): Primary | ICD-10-CM

## 2020-07-31 DIAGNOSIS — M06.09 SERONEGATIVE RHEUMATOID ARTHRITIS OF MULTIPLE SITES (H): ICD-10-CM

## 2020-07-31 DIAGNOSIS — M06.9 RHEUMATOID ARTHRITIS INVOLVING MULTIPLE SITES, UNSPECIFIED RHEUMATOID FACTOR PRESENCE: ICD-10-CM

## 2020-07-31 PROCEDURE — 99214 OFFICE O/P EST MOD 30 MIN: CPT | Mod: 95 | Performed by: STUDENT IN AN ORGANIZED HEALTH CARE EDUCATION/TRAINING PROGRAM

## 2020-07-31 RX ORDER — HYDROXYCHLOROQUINE SULFATE 200 MG/1
TABLET, FILM COATED ORAL
Qty: 180 TABLET | Refills: 1 | Status: SHIPPED | OUTPATIENT
Start: 2020-07-31 | End: 2020-10-14

## 2020-07-31 RX ORDER — MEDROXYPROGESTERONE ACETATE 150 MG/ML
50 INJECTION, SUSPENSION INTRAMUSCULAR WEEKLY
Qty: 1 KIT | Refills: 5 | OUTPATIENT
Start: 2020-07-31 | End: 2020-08-20

## 2020-07-31 RX ORDER — PNEUMOCOCCAL 13-VALENT CONJUGATE VACCINE 2.2; 2.2; 2.2; 2.2; 2.2; 4.4; 2.2; 2.2; 2.2; 2.2; 2.2; 2.2; 2.2 UG/.5ML; UG/.5ML; UG/.5ML; UG/.5ML; UG/.5ML; UG/.5ML; UG/.5ML; UG/.5ML; UG/.5ML; UG/.5ML; UG/.5ML; UG/.5ML; UG/.5ML
0.5 INJECTION, SUSPENSION INTRAMUSCULAR ONCE
Qty: 0.5 ML | Refills: 0 | Status: SHIPPED | OUTPATIENT
Start: 2020-07-31 | End: 2020-07-31

## 2020-07-31 RX ORDER — PNEUMOCOCCAL VACCINE POLYVALENT 25; 25; 25; 25; 25; 25; 25; 25; 25; 25; 25; 25; 25; 25; 25; 25; 25; 25; 25; 25; 25; 25; 25 UG/.5ML; UG/.5ML; UG/.5ML; UG/.5ML; UG/.5ML; UG/.5ML; UG/.5ML; UG/.5ML; UG/.5ML; UG/.5ML; UG/.5ML; UG/.5ML; UG/.5ML; UG/.5ML; UG/.5ML; UG/.5ML; UG/.5ML; UG/.5ML; UG/.5ML; UG/.5ML; UG/.5ML; UG/.5ML; UG/.5ML
0.5 INJECTION, SOLUTION INTRAMUSCULAR; SUBCUTANEOUS ONCE
Qty: 0.5 ML | Refills: 0 | Status: SHIPPED | OUTPATIENT
Start: 2020-11-30 | End: 2020-11-30

## 2020-07-31 NOTE — PATIENT INSTRUCTIONS
Start Enbrel 50 mg subcu weekly    Continue methotrexate 10 tablets once a week    Continue Plaquenil 2 tablets Monday Wednesday Friday and 1 tablet rest of the days    Blood test ordered.  To be done before starting Enbrel.  X-rays also ordered    We will also put order for pneumonia vaccine to be done before starting Enbrel.    Follow-up in 2 months.

## 2020-07-31 NOTE — PROGRESS NOTES
"Lolita Harris is a 45 year old female who is being evaluated via a billable video visit.      The patient has been notified of following:     \"This video visit will be conducted via a call between you and your physician/provider. We have found that certain health care needs can be provided without the need for an in-person physical exam.  This service lets us provide the care you need with a video conversation.  If a prescription is necessary we can send it directly to your pharmacy.  If lab work is needed we can place an order for that and you can then stop by our lab to have the test done at a later time.    Video visits are billed at different rates depending on your insurance coverage.  Please reach out to your insurance provider with any questions.    If during the course of the call the physician/provider feels a video visit is not appropriate, you will not be charged for this service.\"    Patient has given verbal consent for Video visit? Yes  How would you like to obtain your AVS? MyChart  If you are dropped from the video visit, the video invite should be resent to: Text to cell phone: [phone]  Will anyone else be joining your video visit? No        Nury Gamino [facility]    Subjective : Lolita is a 45-year-old female with past medical history of seropositive rheumatoid arthritis, cyclical neutropenia, hypothyroidism evaluated via billable virtual visit for management of rheumatoid arthritis.  She had a positive anti-CCP antibody in September 2018 but since then her rheumatoid serologies have been negative.  Her symptoms are very suggestive of rheumatoid arthritis.  She complains of symmetric arthralgias in her fingers, wrists, ankles, feet.  She has noticed swelling of her left ring finger for quite a long time.  She reports pain in the PIP joints and toes.  Has morning stiffness for about an hour.  She is on Plaquenil 300 mg daily dose and methotrexate.  Methotrexate dose was increased up to 10 " tablets in June.  She denies any side effects with the maximum dose of methotrexate.  Her last methotrexate monitoring labs done 2 weeks ago showed normal liver kidney test and stable CBC.  But she has not noticed marked improvement after increasing methotrexate to the maximum dose.    Review of systems negative except for those mentioned above    Reviewed her past medical, surgical, family history    Pertinent labs:   Component      Latest Ref Rng & Units 7/16/2020   WBC      4.0 - 11.0 10e9/L 3.6 (L)   RBC Count      3.8 - 5.2 10e12/L 4.60   Hemoglobin      11.7 - 15.7 g/dL 14.7   Hematocrit      35.0 - 47.0 % 43.5   MCV      78 - 100 fl 95   MCH      26.5 - 33.0 pg 32.0   MCHC      31.5 - 36.5 g/dL 33.8   RDW      10.0 - 15.0 % 12.8   Platelet Count      150 - 450 10e9/L 215   Creatinine      0.52 - 1.04 mg/dL 0.84   GFR Estimate      >60 mL/min/1.73:m2 84   GFR Estimate If Black      >60 mL/min/1.73:m2 >90   Sed Rate      0 - 20 mm/h 5   CRP Inflammation      0.0 - 8.0 mg/L <2.9   Cyclic Citrullinated Peptide Antibody, IgG      <7 U/mL 1   Rheumatoid Factor      <12 IU/mL <7   Albumin      3.4 - 5.0 g/dL 3.9   ALT      0 - 50 U/L 32   AST      0 - 45 U/L 24     Physical exam: Swelling noted over the left ring finger especially over the PIP joint.  She reports tenderness over second third fourth PIP joints.  Tenderness present over the MTP joints.  She feels stiffness in her hands especially when making a fist.  Left hand feels weaker.    Assessment    Seropositive rheumatoid arthritis  Leukopenia  Hypothyroidism    Seropositive rheumatoid arthritis: She is on 25 mg methotrexate and 300 mg Plaquenil.  Her Plaquenil eye exam was done last week which was normal.  Even on maximum dose of methotrexate she still has pain in her joints.  Has swelling of her fingers.  I discussed about biologic medications.    Side effects of treatment with biologic therapy were reviewed including but not limited to immune suppression,  increased susceptibility to infection, injection site and systemic reactions, and possible increased risk of lymphoma and other malignancy. The patient denies history of hepatitis, personal or first degree relative with demyelinating disease, history of heart failure, history of malignancy, hematologic disorders, other immune compromising medications/disorders, tuberculosis. Discussed need to contact clinic if patient is ill or not feeling well as we may hold medication.Discussed that live vaccinations should not be given while on this medication and clinic should be contact prior to vaccinations.    I will start Enbrel 50 mg subcu once a week.  She will need updated hepatitis and TB serologies before starting Enbrel.  We will also get baseline x-ray of hands, feet, wrists to look for any marginal erosive changes.    She will need Prevnar 13 vaccine before starting Enbrel.  The Pneumovax 23 Valent will be given after 2 to 6 months.    Plan    Start Enbrel 50 mg subcu weekly    Continue methotrexate 10 tablets once a week    Continue Plaquenil 2 tablets Monday Wednesday Friday and 1 tablet rest of the days    Blood test ordered.  To be done before starting Enbrel.  X-rays also ordered    Pneumonia vaccine to be done before starting Enbrel.    Follow-up in 2 months.        Video-Visit Details    Type of service:  Video Visit    Video Start Time: 8:35 AM  Video End Time: 9:04 AM    Originating Location (pt. Location): Home    Distant Location (provider location):  [facility]     Platform used for Video Visit: Ila Menezes MD

## 2020-07-31 NOTE — RESULT ENCOUNTER NOTE
Results discussed with the patient at the time of visit.     Alejandro Menezes MD   7/31/2020  8:36 AM

## 2020-08-03 DIAGNOSIS — M06.09 SERONEGATIVE RHEUMATOID ARTHRITIS OF MULTIPLE SITES (H): Primary | ICD-10-CM

## 2020-08-03 DIAGNOSIS — M06.9 RHEUMATOID ARTHRITIS INVOLVING MULTIPLE SITES, UNSPECIFIED RHEUMATOID FACTOR PRESENCE: ICD-10-CM

## 2020-08-06 NOTE — PROGRESS NOTES
Called patient and left generic vm asking them to call the clinic when they received the message. Clinic number provided.    MUKESH Huntley, RN   Rheumatology Care Coordinator   [facility]          From: Alejandro Menezes MD   Sent: 7/31/2020   8:57 AM CDT   To: DRE Huntley,    This patient will need Enbrel injection training.  I have ordered Enbrel.  She need blood tests before starting Enbrel.  Also I will order pneumonia vaccine, I forgot to mention it to her.  Please let her know.     Alejandro Menezes MD

## 2020-08-11 NOTE — PROGRESS NOTES
Sent patient a michellehart to call and schedule Enbrel training.     Marlen Crisostomo, BSN, RN   Rheumatology Care Coordinator   [facility]

## 2020-08-12 DIAGNOSIS — M06.9 RHEUMATOID ARTHRITIS INVOLVING MULTIPLE SITES, UNSPECIFIED RHEUMATOID FACTOR PRESENCE: ICD-10-CM

## 2020-08-13 ENCOUNTER — ANCILLARY PROCEDURE (OUTPATIENT)
Dept: GENERAL RADIOLOGY | Facility: CLINIC | Age: 45
End: 2020-08-13
Attending: STUDENT IN AN ORGANIZED HEALTH CARE EDUCATION/TRAINING PROGRAM
Payer: COMMERCIAL

## 2020-08-13 DIAGNOSIS — M06.9 RHEUMATOID ARTHRITIS INVOLVING MULTIPLE SITES, UNSPECIFIED RHEUMATOID FACTOR PRESENCE: ICD-10-CM

## 2020-08-13 DIAGNOSIS — M06.09 SERONEGATIVE RHEUMATOID ARTHRITIS OF MULTIPLE SITES (H): ICD-10-CM

## 2020-08-13 DIAGNOSIS — Z79.899 HIGH RISK MEDICATION USE: ICD-10-CM

## 2020-08-13 LAB
ALBUMIN SERPL-MCNC: 3.8 G/DL (ref 3.4–5)
ALT SERPL W P-5'-P-CCNC: 25 U/L (ref 0–50)
AST SERPL W P-5'-P-CCNC: 22 U/L (ref 0–45)
CREAT SERPL-MCNC: 0.82 MG/DL (ref 0.52–1.04)
CRP SERPL-MCNC: <2.9 MG/L (ref 0–8)
ERYTHROCYTE [DISTWIDTH] IN BLOOD BY AUTOMATED COUNT: 12.6 % (ref 10–15)
ERYTHROCYTE [SEDIMENTATION RATE] IN BLOOD BY WESTERGREN METHOD: 5 MM/H (ref 0–20)
GFR SERPL CREATININE-BSD FRML MDRD: 87 ML/MIN/{1.73_M2}
HCT VFR BLD AUTO: 40.6 % (ref 35–47)
HGB BLD-MCNC: 13.6 G/DL (ref 11.7–15.7)
MCH RBC QN AUTO: 31.6 PG (ref 26.5–33)
MCHC RBC AUTO-ENTMCNC: 33.5 G/DL (ref 31.5–36.5)
MCV RBC AUTO: 94 FL (ref 78–100)
PLATELET # BLD AUTO: 205 10E9/L (ref 150–450)
RBC # BLD AUTO: 4.3 10E12/L (ref 3.8–5.2)
WBC # BLD AUTO: 3.7 10E9/L (ref 4–11)

## 2020-08-13 PROCEDURE — 87340 HEPATITIS B SURFACE AG IA: CPT | Performed by: STUDENT IN AN ORGANIZED HEALTH CARE EDUCATION/TRAINING PROGRAM

## 2020-08-13 PROCEDURE — 36415 COLL VENOUS BLD VENIPUNCTURE: CPT | Performed by: STUDENT IN AN ORGANIZED HEALTH CARE EDUCATION/TRAINING PROGRAM

## 2020-08-13 PROCEDURE — 82040 ASSAY OF SERUM ALBUMIN: CPT | Performed by: STUDENT IN AN ORGANIZED HEALTH CARE EDUCATION/TRAINING PROGRAM

## 2020-08-13 PROCEDURE — 86481 TB AG RESPONSE T-CELL SUSP: CPT | Performed by: STUDENT IN AN ORGANIZED HEALTH CARE EDUCATION/TRAINING PROGRAM

## 2020-08-13 PROCEDURE — 84450 TRANSFERASE (AST) (SGOT): CPT | Performed by: STUDENT IN AN ORGANIZED HEALTH CARE EDUCATION/TRAINING PROGRAM

## 2020-08-13 PROCEDURE — 86706 HEP B SURFACE ANTIBODY: CPT | Performed by: STUDENT IN AN ORGANIZED HEALTH CARE EDUCATION/TRAINING PROGRAM

## 2020-08-13 PROCEDURE — 86704 HEP B CORE ANTIBODY TOTAL: CPT | Performed by: STUDENT IN AN ORGANIZED HEALTH CARE EDUCATION/TRAINING PROGRAM

## 2020-08-13 PROCEDURE — 73130 X-RAY EXAM OF HAND: CPT | Mod: LT | Performed by: RADIOLOGY

## 2020-08-13 PROCEDURE — 73110 X-RAY EXAM OF WRIST: CPT | Mod: LT | Performed by: RADIOLOGY

## 2020-08-13 PROCEDURE — 85027 COMPLETE CBC AUTOMATED: CPT | Performed by: STUDENT IN AN ORGANIZED HEALTH CARE EDUCATION/TRAINING PROGRAM

## 2020-08-13 PROCEDURE — 85652 RBC SED RATE AUTOMATED: CPT | Performed by: STUDENT IN AN ORGANIZED HEALTH CARE EDUCATION/TRAINING PROGRAM

## 2020-08-13 PROCEDURE — 82565 ASSAY OF CREATININE: CPT | Performed by: STUDENT IN AN ORGANIZED HEALTH CARE EDUCATION/TRAINING PROGRAM

## 2020-08-13 PROCEDURE — 86140 C-REACTIVE PROTEIN: CPT | Performed by: STUDENT IN AN ORGANIZED HEALTH CARE EDUCATION/TRAINING PROGRAM

## 2020-08-13 PROCEDURE — 86803 HEPATITIS C AB TEST: CPT | Performed by: STUDENT IN AN ORGANIZED HEALTH CARE EDUCATION/TRAINING PROGRAM

## 2020-08-13 PROCEDURE — 84460 ALANINE AMINO (ALT) (SGPT): CPT | Performed by: STUDENT IN AN ORGANIZED HEALTH CARE EDUCATION/TRAINING PROGRAM

## 2020-08-13 PROCEDURE — 73630 X-RAY EXAM OF FOOT: CPT | Mod: RT | Performed by: RADIOLOGY

## 2020-08-14 LAB
HBV CORE AB SERPL QL IA: NONREACTIVE
HBV SURFACE AB SERPL IA-ACNC: 0 M[IU]/ML
HBV SURFACE AG SERPL QL IA: NONREACTIVE
HCV AB SERPL QL IA: NONREACTIVE

## 2020-08-16 LAB
GAMMA INTERFERON BACKGROUND BLD IA-ACNC: 0.1 IU/ML
M TB IFN-G CD4+ BCKGRND COR BLD-ACNC: 9.9 IU/ML
M TB TUBERC IFN-G BLD QL: NEGATIVE
MITOGEN IGNF BCKGRD COR BLD-ACNC: 0.03 IU/ML
MITOGEN IGNF BCKGRD COR BLD-ACNC: 0.05 IU/ML

## 2020-08-17 RX ORDER — FOLIC ACID 1 MG/1
3 TABLET ORAL DAILY
Qty: 270 TABLET | Refills: 3 | Status: SHIPPED | OUTPATIENT
Start: 2020-08-17 | End: 2021-10-20

## 2020-08-17 NOTE — TELEPHONE ENCOUNTER
PA Initiation    Medication: Enbrel   Insurance Company: [employer] ([facility]) - Phone [phone] Fax [phone]  Pharmacy [facility]illing the Rx[facility] [place] [address] [facility]   Filling Pharmacy Phone:    Filling Pharmacy Fax:    Start Date: 8/17/2020    MELISSA ZELAYA (Key: [id number])

## 2020-08-18 NOTE — TELEPHONE ENCOUNTER
PRIOR AUTHORIZATION DENIED    Medication: Enbrel - Denied     Denial Date: 8/18/2020    Denial Rational:      Appeal Information:  Can appeal OR change drug

## 2020-08-20 ENCOUNTER — TELEPHONE (OUTPATIENT)
Dept: RHEUMATOLOGY | Facility: CLINIC | Age: 45
End: 2020-08-20

## 2020-08-20 RX ORDER — PNEUMOCOCCAL 13-VALENT CONJUGATE VACCINE 2.2; 2.2; 2.2; 2.2; 2.2; 4.4; 2.2; 2.2; 2.2; 2.2; 2.2; 2.2; 2.2 UG/.5ML; UG/.5ML; UG/.5ML; UG/.5ML; UG/.5ML; UG/.5ML; UG/.5ML; UG/.5ML; UG/.5ML; UG/.5ML; UG/.5ML; UG/.5ML; UG/.5ML
0.5 INJECTION, SUSPENSION INTRAMUSCULAR ONCE
Qty: 0.5 ML | Refills: 0 | Status: SHIPPED | OUTPATIENT
Start: 2020-08-20 | End: 2020-08-20

## 2020-08-20 RX ORDER — PNEUMOCOCCAL VACCINE POLYVALENT 25; 25; 25; 25; 25; 25; 25; 25; 25; 25; 25; 25; 25; 25; 25; 25; 25; 25; 25; 25; 25; 25; 25 UG/.5ML; UG/.5ML; UG/.5ML; UG/.5ML; UG/.5ML; UG/.5ML; UG/.5ML; UG/.5ML; UG/.5ML; UG/.5ML; UG/.5ML; UG/.5ML; UG/.5ML; UG/.5ML; UG/.5ML; UG/.5ML; UG/.5ML; UG/.5ML; UG/.5ML; UG/.5ML; UG/.5ML; UG/.5ML; UG/.5ML
0.5 INJECTION, SOLUTION INTRAMUSCULAR; SUBCUTANEOUS ONCE
Qty: 0.5 ML | Refills: 0 | Status: SHIPPED | OUTPATIENT
Start: 2020-10-28 | End: 2020-10-28

## 2020-08-20 NOTE — TELEPHONE ENCOUNTER
I will order Humira instead of Enbrel. I ordered Prevnar 13 vaccine, she can get it done at the pharmacy. I have ordered Pneumovax as well to be done 2 months after the first dose. Please coordinate.

## 2020-08-20 NOTE — TELEPHONE ENCOUNTER
Spoke with patient. Informed her Humira has been approved. Provided her pharmacy phone number. Discussed vaccinations and to call once she has Humira to set up nurse visit. She expressed understanding and had no further questions.     LAKE HuntleyN, RN   Rheumatology Care Coordinator   [facility]

## 2020-08-20 NOTE — TELEPHONE ENCOUNTER
Prior Authorization Approval    Authorization Effective Date: 8/20/2020  Authorization Expiration Date: 8/20/2021  Medication: HUMIRA - Approved   Approved Dose/Quantity:  2 for 28 days   Reference #:     Insurance Company: [employer] ([facility]) - Phone [phone] Fax [phone]  Expected CoPay: $5     CoPay Card Available:      Foundation Assistance Needed:    Which Pharmacy is filling the prescription (Not needed for infusion/clinic administered): [facility] - [place], [place] - [address]  Pharmacy Notified: Yes  Patient Notified: Yes

## 2020-08-21 ENCOUNTER — TELEPHONE (OUTPATIENT)
Dept: RHEUMATOLOGY | Facility: CLINIC | Age: 45
End: 2020-08-21

## 2020-08-21 NOTE — TELEPHONE ENCOUNTER
[facility] Call Center    Phone Message    May a detailed message be left on voicemail: yes     Reason for Call: Other: Patient would like a call back to discuss the pneumonia vaccine that was ordered for her. She found out there are two vaccines. She got the first one today, 08.21.20. She is supposed to wait 8 weeks before getting the second one. Not sure if she's supposed to get both or if she should only get the one vaccine.      Action Taken: Message routed to:  Adult Clinics: Rheumatology p [id number]    Travel Screening: Not Applicable

## 2020-08-21 NOTE — TELEPHONE ENCOUNTER
Discussed that patient should have next vaccine in 8 weeks and it is okay to do while being on Humira. Set up nurse visit for injection teaching and discussed that patient should stay on methotrexate and plaquenil per office note. She had no further questions.     Marlen Crisostomo, LAKEN, RN   Rheumatology Care Coordinator   [facility]

## 2020-08-27 ENCOUNTER — ALLIED HEALTH/NURSE VISIT (OUTPATIENT)
Dept: NURSING | Facility: CLINIC | Age: 45
End: 2020-08-27
Payer: COMMERCIAL

## 2020-08-27 VITALS
OXYGEN SATURATION: 96 % | DIASTOLIC BLOOD PRESSURE: 63 MMHG | HEART RATE: 80 BPM | TEMPERATURE: 98.4 F | SYSTOLIC BLOOD PRESSURE: 89 MMHG

## 2020-08-27 DIAGNOSIS — M06.9 RA (RHEUMATOID ARTHRITIS) (H): Primary | ICD-10-CM

## 2020-08-27 PROCEDURE — 99207 ZZC NO CHARGE NURSE ONLY: CPT

## 2020-08-27 NOTE — PROGRESS NOTES
Relevant Diagnosis: RA    Teaching Topic:  Self injection of Humira    Person(s) involved in teaching:  Patient    Motivation Level:   Asks Questions:   Yes  Eager to Learn:  Yes  Cooperative:  Yes  Receptive (willing/able to accept information):  Yes  Comments: reviewed instructions     Patient demonstrates understanding of the following:  Reason for the appointment, diagnosis and treatment plan:  Yes  Knowledge of proper use of medications and conditions for which they are ordered (with special attention to potential side effects or drug interactions):  Yes  Which situations necessitate calling provider and whom to contact:  Yes (signs of infection, temp > 101, recurrent bouts of illness or infection or if patient has been put on an antibiotic)    Teaching Concerns:  No.  Demonstration was given by nurse with patient doing return demonstrations using practice supplies until she is comfortable  and Patient completed self injection of Humira using good technique with minimal coaching using her own supply of medication.     Proper use and care of Medication:  Yes  Nutritional needs and diet plan:  N/A  Pain management techniques:  Yes  Patient instructed on hand hygiene:  Yes  How and/when to access community resources:  Yes      Infection Prevention:  Patient demonstrates understanding of the following:  Signs and symptoms of infection taught:  Yes      Instructional Materials Used/Given:teach back       Time spent teaching with patient:  Spent 15 minutes with patient teaching proper technique and observation of patient performing self injection. Answered all of the patient s questions to his satisfaction.    Medication prescribed by Dr. Menezes , onsite provider Dr. Willis.       LAKE HuntleyN, RN   Rheumatology Care Coordinator   [facility]

## 2020-10-14 ENCOUNTER — VIRTUAL VISIT (OUTPATIENT)
Dept: RHEUMATOLOGY | Facility: CLINIC | Age: 45
End: 2020-10-14
Payer: COMMERCIAL

## 2020-10-14 DIAGNOSIS — M06.09 SERONEGATIVE RHEUMATOID ARTHRITIS OF MULTIPLE SITES (H): ICD-10-CM

## 2020-10-14 DIAGNOSIS — M05.79 RHEUMATOID ARTHRITIS, SEROPOSITIVE, MULTIPLE SITES (H): ICD-10-CM

## 2020-10-14 PROCEDURE — 99214 OFFICE O/P EST MOD 30 MIN: CPT | Mod: 95 | Performed by: STUDENT IN AN ORGANIZED HEALTH CARE EDUCATION/TRAINING PROGRAM

## 2020-10-14 RX ORDER — HYDROXYCHLOROQUINE SULFATE 200 MG/1
TABLET, FILM COATED ORAL
Qty: 180 TABLET | Refills: 1 | Status: SHIPPED | OUTPATIENT
Start: 2020-10-14 | End: 2021-02-25

## 2020-10-14 RX ORDER — NARATRIPTAN 1 MG/1
1 TABLET ORAL
COMMUNITY

## 2020-10-14 NOTE — RESULT ENCOUNTER NOTE
Results discussed with the patient at the time of visit.     Alejandro Menezes MD   10/14/2020  10:56 AM

## 2020-10-14 NOTE — PROGRESS NOTES
"Lolita Harris is a 45 year old female who is being evaluated via a billable video visit.      The patient has been notified of following:     \"This video visit will be conducted via a call between you and your physician/provider. We have found that certain health care needs can be provided without the need for an in-person physical exam.  This service lets us provide the care you need with a video conversation.  If a prescription is necessary we can send it directly to your pharmacy.  If lab work is needed we can place an order for that and you can then stop by our lab to have the test done at a later time.    Video visits are billed at different rates depending on your insurance coverage.  Please reach out to your insurance provider with any questions.    If during the course of the call the physician/provider feels a video visit is not appropriate, you will not be charged for this service.\"    Patient has given verbal consent for Video visit? Yes  How would you like to obtain your AVS? MyChart  If you are dropped from the video visit, the video invite should be resent to: Text to cell phone: [phone]  Will anyone else be joining your video visit? No      Nury Gamino [facility]    Subjective : Lolita is a 45-year-old female with past medical history of seropositive rheumatoid arthritis, cyclical neutropenia, hypothyroidism evaluated via billable virtual visit for management of rheumatoid arthritis.  She had a positive anti-CCP antibody in September 2018 but after that her rheumatoid serologies have been negative.  Her symptoms are very suggestive of rheumatoid arthritis.  She presented with symmetric arthralgias in her fingers, wrists, ankles, feet.  She is on Plaquenil 300 mg dose and methotrexate.  Dose of methotrexate was increased to 10 tablets in June 2020.  Due to persistent pain Humira was added 2 months ago.  She did notice improvement with Humira but she feels that its effect does not last longer.  " Swelling in her hands have improved.  She has noticed more headaches in the last few months.  Denies any oral sores, nausea, hair loss.  Her Plaquenil eye exam on 28 July of this year was normal.  She is not on prednisone and rarely has to take ibuprofen.    Review of systems negative except for those mentioned above    Reviewed past medical, surgical, family history    Pertinent labs:   Component      Latest Ref Rng & Units 8/13/2020   WBC      4.0 - 11.0 10e9/L 3.7 (L)   RBC Count      3.8 - 5.2 10e12/L 4.30   Hemoglobin      11.7 - 15.7 g/dL 13.6   Hematocrit      35.0 - 47.0 % 40.6   MCV      78 - 100 fl 94   MCH      26.5 - 33.0 pg 31.6   MCHC      31.5 - 36.5 g/dL 33.5   RDW      10.0 - 15.0 % 12.6   Platelet Count      150 - 450 10e9/L 205   MTB Quantiferon Result      NEG:Negative Negative   TB1 Ag minus Nil      IU/mL 0.03   TB2 Ag minus Nil      IU/mL 0.05   Mitogen minus Nil      IU/mL 9.90   Nil Result      IU/mL 0.10   Creatinine      0.52 - 1.04 mg/dL 0.82   GFR Estimate      >60 mL/min/1.73:m2 87   GFR Estimate If Black      >60 mL/min/1.73:m2 >90   Hepatitis C Antibody      NR:Nonreactive Nonreactive   Hep B Surface Agn      NR:Nonreactive Nonreactive   Hepatitis B Core Makenzie      NR:Nonreactive Nonreactive   Hepatitis B Surface Antibody      <8.00 m[IU]/mL 0.00   Sed Rate      0 - 20 mm/h 5   CRP Inflammation      0.0 - 8.0 mg/L <2.9   ALT      0 - 50 U/L 25   AST      0 - 45 U/L 22   Albumin      3.4 - 5.0 g/dL 3.8       Physical exam : Swelling noted over the left ring finger improved.  Tenderness over second third fourth PIP joints better.  Tenderness present over the MTP joints.  She feels stiffness in her hands especially when making a fist.  Left hand feels weaker.    Assessment     Seropositive rheumatoid arthritis  Leukopenia  Hypothyroidism    Seropositive rheumatoid arthritis: She has noticed improvement after addition of Humira to methotrexate and Plaquenil combination.  Since methotrexate  was added just 8 weeks ago I expect to notice more clinical improvement down the road.  She feels that Humira effect does not last longer.  In some patients Humira can be taken every 10 days but since she has just started it I would continue with the same frequency.    She reports increased headaches.  It could be related to methotrexate use.  She can increase folic acid to 4 mg daily to see if that helps.  If headaches continue to worsen then will have to reduce the dose of methotrexate back to 8 tablets once a week.    Continue hydroxychloroquine 300 mg daily dose.    Plan     Will continue Humira 40 mg subcutaneous once every 2 weeks     Continue Methotrexate 10 tab once a week. For headache she can increase the folic acid to 4 mg daily. If headaches do not improve decrease Methotrexate to 9 tab once a week and then to 8 tab if needed     Continue Plaquenil.     Labs every 3 months.     RTC in 3 months.     Video-Visit Details    Type of service:  Video Visit    Video Start Time: 10:40 AM   Video End Time: 11:00 AM    Originating Location (pt. Location): Home    Distant Location (provider location):  [facility]     Platform used for Video Visit: Gay Menezes MD

## 2020-10-14 NOTE — PATIENT INSTRUCTIONS
Will continue Humira 40 mg subcutaneous once every 2 weeks     Continue Methotrexate 10 tab once a week. For headache she can increase the folic acid to 4 mg daily. If headaches do not improve decrease Methotrexate to 9 tab once a week and then to 8 tab if needed     Continue Plaquenil.     Labs every 3 months.     RTC in 3 months.

## 2020-10-14 NOTE — RESULT ENCOUNTER NOTE
Results discussed with the patient at the time of visit.     Alejandro Menezes MD   10/14/2020  10:57 AM

## 2020-11-16 ENCOUNTER — HEALTH MAINTENANCE LETTER (OUTPATIENT)
Age: 45
End: 2020-11-16

## 2021-01-03 NOTE — TELEPHONE ENCOUNTER
PA Initiation    Medication: HUMIRA - CHANGED FROM ENBREL   Insurance Company: [employer] ([facility]) - Phone [phone] Fax [phone]  Pharmacy Filling the Rx: [facility] - [place], [place] - [facility] [address]  Filling Pharmacy Phone:    Filling Pharmacy Fax:    Start Date: 8/20/2020    MELISSA ZELAYA (Key: [id number])   Statement Selected

## 2021-01-15 DIAGNOSIS — M06.9 RHEUMATOID ARTHRITIS INVOLVING MULTIPLE SITES (H): ICD-10-CM

## 2021-01-15 DIAGNOSIS — Z79.899 HIGH RISK MEDICATION USE: ICD-10-CM

## 2021-01-15 LAB
ALBUMIN SERPL-MCNC: 3.8 G/DL (ref 3.4–5)
ALT SERPL W P-5'-P-CCNC: 32 U/L (ref 0–50)
AST SERPL W P-5'-P-CCNC: 19 U/L (ref 0–45)
CREAT SERPL-MCNC: 0.82 MG/DL (ref 0.52–1.04)
CRP SERPL-MCNC: <2.9 MG/L (ref 0–8)
ERYTHROCYTE [DISTWIDTH] IN BLOOD BY AUTOMATED COUNT: 12.7 % (ref 10–15)
ERYTHROCYTE [SEDIMENTATION RATE] IN BLOOD BY WESTERGREN METHOD: 4 MM/H (ref 0–20)
GFR SERPL CREATININE-BSD FRML MDRD: 86 ML/MIN/{1.73_M2}
HCT VFR BLD AUTO: 41.7 % (ref 35–47)
HGB BLD-MCNC: 13.8 G/DL (ref 11.7–15.7)
MCH RBC QN AUTO: 31.7 PG (ref 26.5–33)
MCHC RBC AUTO-ENTMCNC: 33.1 G/DL (ref 31.5–36.5)
MCV RBC AUTO: 96 FL (ref 78–100)
PLATELET # BLD AUTO: 207 10E9/L (ref 150–450)
RBC # BLD AUTO: 4.35 10E12/L (ref 3.8–5.2)
WBC # BLD AUTO: 3.1 10E9/L (ref 4–11)

## 2021-01-15 PROCEDURE — 82040 ASSAY OF SERUM ALBUMIN: CPT | Performed by: STUDENT IN AN ORGANIZED HEALTH CARE EDUCATION/TRAINING PROGRAM

## 2021-01-15 PROCEDURE — 84450 TRANSFERASE (AST) (SGOT): CPT | Performed by: STUDENT IN AN ORGANIZED HEALTH CARE EDUCATION/TRAINING PROGRAM

## 2021-01-15 PROCEDURE — 86140 C-REACTIVE PROTEIN: CPT | Performed by: STUDENT IN AN ORGANIZED HEALTH CARE EDUCATION/TRAINING PROGRAM

## 2021-01-15 PROCEDURE — 84460 ALANINE AMINO (ALT) (SGPT): CPT | Performed by: STUDENT IN AN ORGANIZED HEALTH CARE EDUCATION/TRAINING PROGRAM

## 2021-01-15 PROCEDURE — 85027 COMPLETE CBC AUTOMATED: CPT | Performed by: STUDENT IN AN ORGANIZED HEALTH CARE EDUCATION/TRAINING PROGRAM

## 2021-01-15 PROCEDURE — 85652 RBC SED RATE AUTOMATED: CPT | Performed by: STUDENT IN AN ORGANIZED HEALTH CARE EDUCATION/TRAINING PROGRAM

## 2021-01-15 PROCEDURE — 36415 COLL VENOUS BLD VENIPUNCTURE: CPT | Performed by: STUDENT IN AN ORGANIZED HEALTH CARE EDUCATION/TRAINING PROGRAM

## 2021-01-15 PROCEDURE — 82565 ASSAY OF CREATININE: CPT | Performed by: STUDENT IN AN ORGANIZED HEALTH CARE EDUCATION/TRAINING PROGRAM

## 2021-02-01 ENCOUNTER — VIRTUAL VISIT (OUTPATIENT)
Dept: RHEUMATOLOGY | Facility: CLINIC | Age: 46
End: 2021-02-01
Payer: COMMERCIAL

## 2021-02-01 DIAGNOSIS — M05.79 RHEUMATOID ARTHRITIS, SEROPOSITIVE, MULTIPLE SITES (H): Primary | ICD-10-CM

## 2021-02-01 PROCEDURE — 99214 OFFICE O/P EST MOD 30 MIN: CPT | Mod: 95 | Performed by: STUDENT IN AN ORGANIZED HEALTH CARE EDUCATION/TRAINING PROGRAM

## 2021-02-01 NOTE — PATIENT INSTRUCTIONS
Continue Humira 40 mg subcu every 2 weeks    Continue methotrexate 8 tablets once a week    Continue Plaquenil 2 tablets on Monday Wednesday Friday and 1 tablet rest of the days    Follow-up in 3 months

## 2021-02-01 NOTE — RESULT ENCOUNTER NOTE
Results discussed with the patient at the time of visit.     Alejandro Menezes MD   2/1/2021  12:12 PM

## 2021-02-01 NOTE — PROGRESS NOTES
Lolita is a 45 year old who is being evaluated via a billable video visit.      How would you like to obtain your AVS? MyChart  If the video visit is dropped, the invitation should be resent by: Text to cell phone: [phone]  Will anyone else be joining your video visit? Jeanette G[facility]    Video Start Time: 12:13 PM       Subjective : Lolita is a 45-year-old female with past medical history of seropositive rheumatoid arthritis, cyclical neutropenia, hypothyroidism evaluated via billable virtual visit for management of rheumatoid arthritis.  She had a positive anti-CCP antibody in September 2018 but after that her rheumatoid serologies have been negative.  Her symptoms are very suggestive of rheumatoid arthritis.  She presented with symmetric arthralgias in her fingers, wrists, ankles, feet.  She is on Plaquenil 300 mg dose and methotrexate.  Dose of methotrexate was increased to 10 tablets in June 2020 but due to persistent headaches dose was reduced back to 8 tablets.  Due to persistent pain Humira was added in 8/2020.    Overall she has noticed improvement with combination of Humira, methotrexate and hydroxychloroquine.  Swelling over her hands has reduced.  She still have some stiffness but it is getting better.    Headaches are getting little bit better.  She has more discomfort in her hips and knees which usually occurs in the cold months.     Review of systems negative except for those mentioned above    Reviewed past medical, surgical, family history    Pertinent labs:   Component      Latest Ref Rng & Units 1/15/2021   WBC      4.0 - 11.0 10e9/L 3.1 (L)   RBC Count      3.8 - 5.2 10e12/L 4.35   Hemoglobin      11.7 - 15.7 g/dL 13.8   Hematocrit      35.0 - 47.0 % 41.7   MCV      78 - 100 fl 96   MCH      26.5 - 33.0 pg 31.7   MCHC      31.5 - 36.5 g/dL 33.1   RDW      10.0 - 15.0 % 12.7   Platelet Count      150 - 450 10e9/L 207   Creatinine      0.52 - 1.04 mg/dL 0.82   GFR Estimate      >60  mL/min/1.73:m2 86   GFR Estimate If Black      >60 mL/min/1.73:m2 >90   Sed Rate      0 - 20 mm/h 4   CRP Inflammation      0.0 - 8.0 mg/L <2.9   ALT      0 - 50 U/L 32   AST      0 - 45 U/L 19   Albumin      3.4 - 5.0 g/dL 3.8       Physical exam : Moderately built, appears stated age, cooperative  Musculoskeletal: Swelling of her hands improved.  Tenderness over MCP, PIP joints improved.  She can make a complete fist with her hands.  No tenderness reported over bilateral ankles, MTP joints.    Assessment :     Seropositive rheumatoid arthritis  Chronic leukopenia  High risk medication use    Seropositive rheumatoid arthritis: She is on Humira 40 mg subcu every 2 weeks and methotrexate 8 tablets once a week and hydroxychloroquine 300 mg daily.  Overall her symptoms have improved.  Her headaches have improved after reducing methotrexate dose from 10 tablets to 8 tablets.  Denies any side effects with the medications.  Her recent methotrexate monitoring labs are normal except for chronic leukopenia.    Vaccinations: Vaccinations reviewed with Ms Harris. Risks and benefits of vaccinations were discussed.  - Influenza: encouraged yearly vaccination  - Orijfmi49: 8/21/20  - Mknawosxc21: 10/15/20  - Zostavax: after age 50      Plan :     Continue Humira 40 mg subcu every 2 weeks    Continue methotrexate 8 tablets once a week  --q 3 mo AST/ALT, Albumin, CBC with plts  --Limit EtOH intake to 2 drinks weekly; use folate 1 mg daily.  --Tylenol 500 mg tid prn nausea/HA associated with dosing.      Continue Plaquenil 2 tablets on Monday Wednesday Friday and 1 tablet rest of the days    Follow-up in 3 months      Video-Visit Details    Type of service:  Video Visit    Video End Time:12:21 PM    Originating Location (pt. Location): Home    Distant Location (provider location):  [facility]     Platform used for Video Visit: Ila

## 2021-02-23 DIAGNOSIS — M06.09 SERONEGATIVE RHEUMATOID ARTHRITIS OF MULTIPLE SITES (H): ICD-10-CM

## 2021-02-23 NOTE — LETTER
Lolita Harris  [address]  [facility] [address]    Dear Lolita Harris,     Regular eye exams are required while taking hydroxychloroquine (Plaquenil). Eye exams should be completed by an eye specialist who is experienced in monitoring for hydroxychloroquine toxicity (a rare effect of the drug that can damage your eyes and vision).  These may be yearly or as determined by your eye specialist.     Although vision problems and loss of sight while taking hydroxychloroquine are very rare, notify your doctor immediately if you notice changes in your vision. The goal of screening is to detect toxicity before your vision is significantly or noticeably impacted. Failing to get proper screening exams puts you at risk of vision changes which may or may not be reversible.    Per the American Academy of Ophthalmology recommendations (2016), screening tests performed may include a 10-2 visual field test, spectral-domain optical coherence tomography (SD OCT), or other screening tests as determined by the eye specialist, including a multifocal electroretinogram (mfERG) or fundus auto-fluorescence (FAF).    We received a refill request from your pharmacy. A copy of your current eye exam was not found in your medical record. Your hydroxychloroquine prescription has been refilled with a limited supply pending confirmation you have had an eye exam to test for hydroxychloroquine toxicity.      We encourage you to bring this letter to your eye exam to discuss the exams that will be performed during your visit. Please request your eye clinic fax or mail a copy of your eye exam report to our clinic indicating that testing was completed for hydroxychloroquine toxicity screening. The exam notes must specifically comment on the potential for hydroxychloroquine toxicity and outline recommended follow-up.    If you have questions about hydroxychloroquine or the information in this letter, please call the clinic at [phone] or  talk to your provider at your next office visit.                        2    Eye Specialist Letter  Dear Eye Specialist,  To ensure safe use of Plaquenil (hydroxychloroquine), we are requesting your assistance in providing the following information. Please return this form to our clinic via mail or fax, or incorporate this information into your visit summary. Your note must indicate whether or not there is evidence of toxicity from Plaquenil use. For questions regarding this form, please call [phone].  Patient Name:   :             Date of Exam:    The following exams were completed during this visit in accordance with the American Academy of Ophthalmology recommendations (2016):  ? 10-2 automated visual field  ? Spectral-domain optical coherence tomography (SD OCT)  ? Multifocal electroretinogram (mfERG)  ? Fundus autofluorescence (FAF)  ? Other (please specify)  Please select from the following:  ? The findings from the above exams are not suggestive of toxicity from Plaquenil (hydroxychloroquine).  ? The findings from the above exams may suggest toxicity from Plaquenil (hydroxychloroquine).  Directly contact the clinic and fax this form.  Please provide additional guidance on whether or not the medication may be continued from your perspective:  Date of next recommended Plaquenil (hydroxychloroquine) screening eye exam:   ? 5 years  ? 1 year  ? 6 months  Other (please specify):   Eye Specialist Name (print):                                                                Date:  Eye Specialist Signature:

## 2021-02-25 RX ORDER — HYDROXYCHLOROQUINE SULFATE 200 MG/1
TABLET, FILM COATED ORAL
Qty: 130 TABLET | Refills: 1 | Status: SHIPPED | OUTPATIENT
Start: 2021-02-25 | End: 2021-06-03

## 2021-02-25 NOTE — TELEPHONE ENCOUNTER
HYDROXYCHLOROQUINE  200MG TABLET  TB  Plaquenil      Last Written Prescription Date:  10/14/2020  Last Fill Quantity: 180,   # refills: 1  Last Office Visit: 2/1/2021  Future Office visit: None  Last Eye Exam: No updated eye exam??    Routing refill request to provider for review/approval because:  No updated eye exam     Letter sent  Provider notified     Krystal Toro RN  Central Triage Red Flags/Med Refills

## 2021-03-10 ENCOUNTER — TELEPHONE (OUTPATIENT)
Dept: RHEUMATOLOGY | Facility: CLINIC | Age: 46
End: 2021-03-10

## 2021-03-10 NOTE — TELEPHONE ENCOUNTER
[facility] Call Center    Phone Message    May a detailed message be left on voicemail: yes     Reason for Call: Other: Patient is taking medications through Dr Menezes and has some safety concerns after getting her first covid vaccine (debbie&debbie) last night. She would like to talk to a nurse about this if possible. Please give her a call.      Action Taken: Message routed to:  Adult Clinics: Rheumatology p [phone]    Travel Screening: Not Applicable

## 2021-03-10 NOTE — TELEPHONE ENCOUNTER
Patient received COVID vaccine yesterday. She is inquiring if modifications need to be made with her medications. I told her we have been following guidelines from the American College of Rheumatology. Per there most up to date guidelines, there are no modifications required for her Humira or Plaquenil. However, they do recommend holding methotrexate for 7 days following each dose of the vaccine. She verbalized understanding and had no further questions.     Sydney Constantino, BSN, RN  Medical Specialty Care Coordinator  [facility]

## 2021-06-03 DIAGNOSIS — M06.09 SERONEGATIVE RHEUMATOID ARTHRITIS OF MULTIPLE SITES (H): ICD-10-CM

## 2021-06-03 RX ORDER — HYDROXYCHLOROQUINE SULFATE 200 MG/1
TABLET, FILM COATED ORAL
Qty: 130 TABLET | Refills: 1 | Status: SHIPPED | OUTPATIENT
Start: 2021-06-03 | End: 2021-08-27

## 2021-06-03 NOTE — TELEPHONE ENCOUNTER
Klarissa sent to patient inquiring about eye exam and informing patient she is due for a visit with instructions on how to schedule.     MUKESH Thomas, RN   Medical Specialty Care Coordinator   [facility]

## 2021-06-03 NOTE — TELEPHONE ENCOUNTER
hydroxychloroquine (PLAQUENIL) 200 MG tablet  Last Written Prescription Date:  2/25/21  Last Fill Quantity: 130,  # refills: 1   Last office visit: 2/1/2021 with prescribing provider:  Dr. Menezes   Future Office Visit:  No upcoming appointment    Last plaquenil eye exam on file 3/14/2019 with Dr. [facility]. Last labs completed on 1/15/2021.      LUKE Mohr [facility] Rheumatology

## 2021-06-21 ENCOUNTER — MYC MEDICAL ADVICE (OUTPATIENT)
Dept: RHEUMATOLOGY | Facility: CLINIC | Age: 46
End: 2021-06-21

## 2021-06-21 DIAGNOSIS — M05.79 RHEUMATOID ARTHRITIS, SEROPOSITIVE, MULTIPLE SITES (H): ICD-10-CM

## 2021-06-21 NOTE — TELEPHONE ENCOUNTER
Medication/Dose: adalimumab (HUMIRA *CF*) 40 MG/0.4ML pen kit  Last Written Prescription Date: 10/14/2020  Last Fill Quantity: 1, # refills: 5  Last Office Visit with Rheumatology Provider:    Next 5 appointments (look out 90 days)    Jun 22, 2021  4:50 PM  Lab visit with [facility] ([facility] [address] [address] [place] [address] [phone]             WBC   Date Value Ref Range Status   01/15/2021 3.1 (L) 4.0 - 11.0 10e9/L Final     RBC Count   Date Value Ref Range Status   01/15/2021 4.35 3.8 - 5.2 10e12/L Final     Hemoglobin   Date Value Ref Range Status   01/15/2021 13.8 11.7 - 15.7 g/dL Final     Hematocrit   Date Value Ref Range Status   01/15/2021 41.7 35.0 - 47.0 % Final     MCV   Date Value Ref Range Status   01/15/2021 96 78 - 100 fl Final     MCH   Date Value Ref Range Status   01/15/2021 31.7 26.5 - 33.0 pg Final     MCHC   Date Value Ref Range Status   01/15/2021 33.1 31.5 - 36.5 g/dL Final     RDW   Date Value Ref Range Status   01/15/2021 12.7 10.0 - 15.0 % Final     Platelet Count   Date Value Ref Range Status   01/15/2021 207 150 - 450 10e9/L Final     AST   Date Value Ref Range Status   01/15/2021 19 0 - 45 U/L Final     ALT   Date Value Ref Range Status   01/15/2021 32 0 - 50 U/L Final     Creatinine   Date Value Ref Range Status   01/15/2021 0.82 0.52 - 1.04 mg/dL Final     Albumin   Date Value Ref Range Status   01/15/2021 3.8 3.4 - 5.0 g/dL Final     Color Urine (no units)   Date Value   08/24/2007 Yellow     Appearance Urine (no units)   Date Value   08/24/2007 Clear     Glucose Urine (mg/dL)   Date Value   08/24/2007 Negative     Bilirubin Urine (no units)   Date Value   08/24/2007 Negative     Ketones Urine (mg/dL)   Date Value   08/24/2007 15 (A)     Specific Gravity Urine (no units)   Date Value   08/24/2007 <=1.005     pH Urine (pH)   Date Value   08/24/2007 7.0     Protein Albumin Urine  (mg/dL)   Date Value   08/24/2007 Negative     Urobilinogen Urine (EU/dL)   Date Value   08/24/2007 0.2     Nitrite Urine (no units)   Date Value   08/24/2007 Negative     Leukocyte Esterase Urine (no units)   Date Value   08/24/2007 Negative             Prescription approved per Rheumatology Refill Protocol for 90 day supply and 0 refills.    LAKE ThomasN, RN   Medical Specialty Care Coordinator   [facility]

## 2021-06-22 DIAGNOSIS — M06.9 RHEUMATOID ARTHRITIS INVOLVING MULTIPLE SITES (H): ICD-10-CM

## 2021-06-22 DIAGNOSIS — Z79.899 HIGH RISK MEDICATION USE: ICD-10-CM

## 2021-06-22 LAB
ALBUMIN SERPL-MCNC: 3.9 G/DL (ref 3.4–5)
ALT SERPL W P-5'-P-CCNC: 30 U/L (ref 0–50)
AST SERPL W P-5'-P-CCNC: 22 U/L (ref 0–45)
CREAT SERPL-MCNC: 0.88 MG/DL (ref 0.52–1.04)
CRP SERPL-MCNC: <2.9 MG/L (ref 0–8)
ERYTHROCYTE [DISTWIDTH] IN BLOOD BY AUTOMATED COUNT: 12.6 % (ref 10–15)
ERYTHROCYTE [SEDIMENTATION RATE] IN BLOOD BY WESTERGREN METHOD: 4 MM/H (ref 0–20)
GFR SERPL CREATININE-BSD FRML MDRD: 79 ML/MIN/{1.73_M2}
HCT VFR BLD AUTO: 41.8 % (ref 35–47)
HGB BLD-MCNC: 13.7 G/DL (ref 11.7–15.7)
MCH RBC QN AUTO: 31.4 PG (ref 26.5–33)
MCHC RBC AUTO-ENTMCNC: 32.8 G/DL (ref 31.5–36.5)
MCV RBC AUTO: 96 FL (ref 78–100)
PLATELET # BLD AUTO: 229 10E9/L (ref 150–450)
RBC # BLD AUTO: 4.37 10E12/L (ref 3.8–5.2)
WBC # BLD AUTO: 5.3 10E9/L (ref 4–11)

## 2021-06-22 PROCEDURE — 82040 ASSAY OF SERUM ALBUMIN: CPT | Performed by: STUDENT IN AN ORGANIZED HEALTH CARE EDUCATION/TRAINING PROGRAM

## 2021-06-22 PROCEDURE — 36415 COLL VENOUS BLD VENIPUNCTURE: CPT | Performed by: STUDENT IN AN ORGANIZED HEALTH CARE EDUCATION/TRAINING PROGRAM

## 2021-06-22 PROCEDURE — 85652 RBC SED RATE AUTOMATED: CPT | Performed by: STUDENT IN AN ORGANIZED HEALTH CARE EDUCATION/TRAINING PROGRAM

## 2021-06-22 PROCEDURE — 85027 COMPLETE CBC AUTOMATED: CPT | Performed by: STUDENT IN AN ORGANIZED HEALTH CARE EDUCATION/TRAINING PROGRAM

## 2021-06-22 PROCEDURE — 82565 ASSAY OF CREATININE: CPT | Performed by: STUDENT IN AN ORGANIZED HEALTH CARE EDUCATION/TRAINING PROGRAM

## 2021-06-22 PROCEDURE — 86140 C-REACTIVE PROTEIN: CPT | Performed by: STUDENT IN AN ORGANIZED HEALTH CARE EDUCATION/TRAINING PROGRAM

## 2021-06-22 PROCEDURE — 84450 TRANSFERASE (AST) (SGOT): CPT | Performed by: STUDENT IN AN ORGANIZED HEALTH CARE EDUCATION/TRAINING PROGRAM

## 2021-06-22 PROCEDURE — 84460 ALANINE AMINO (ALT) (SGPT): CPT | Performed by: STUDENT IN AN ORGANIZED HEALTH CARE EDUCATION/TRAINING PROGRAM

## 2021-07-16 DIAGNOSIS — M05.79 RHEUMATOID ARTHRITIS, SEROPOSITIVE, MULTIPLE SITES (H): ICD-10-CM

## 2021-07-16 DIAGNOSIS — M06.09 SERONEGATIVE RHEUMATOID ARTHRITIS OF MULTIPLE SITES (H): ICD-10-CM

## 2021-07-19 NOTE — TELEPHONE ENCOUNTER
methotrexate 2.5 MG tablet      Last Written Prescription Date:  10-  Last Fill Quantity: 120,   # refills: 0  Last Office Visit: 2-1-2021  Future Office visit:  8-    CBC RESULTS: Recent Labs   Lab Test 06/22/21  1647   WBC 5.3   RBC 4.37   HGB 13.7   HCT 41.8   MCV 96   MCH 31.4   MCHC 32.8   RDW 12.6          Creatinine   Date Value Ref Range Status   06/22/2021 0.88 0.52 - 1.04 mg/dL Final   ]    Liver Function Studies -   Recent Labs   Lab Test 06/22/21  1647   ALBUMIN 3.9   AST 22   ALT 30       Routing refill request to provider for review/approval because:  Not on protocol.  Gap in therapy?      Kathleen M Doege RN

## 2021-08-27 ENCOUNTER — VIRTUAL VISIT (OUTPATIENT)
Dept: RHEUMATOLOGY | Facility: CLINIC | Age: 46
End: 2021-08-27
Payer: COMMERCIAL

## 2021-08-27 DIAGNOSIS — M06.09 SERONEGATIVE RHEUMATOID ARTHRITIS OF MULTIPLE SITES (H): ICD-10-CM

## 2021-08-27 DIAGNOSIS — M05.79 RHEUMATOID ARTHRITIS, SEROPOSITIVE, MULTIPLE SITES (H): ICD-10-CM

## 2021-08-27 PROCEDURE — 99214 OFFICE O/P EST MOD 30 MIN: CPT | Mod: GT | Performed by: STUDENT IN AN ORGANIZED HEALTH CARE EDUCATION/TRAINING PROGRAM

## 2021-08-27 RX ORDER — HYDROXYCHLOROQUINE SULFATE 200 MG/1
TABLET, FILM COATED ORAL
Qty: 130 TABLET | Refills: 1 | Status: SHIPPED | OUTPATIENT
Start: 2021-08-27 | End: 2021-12-06

## 2021-08-27 NOTE — PATIENT INSTRUCTIONS
Continue methotrexate 6 tablets once a week    Continue Humira 40 mg subcu every 2 weeks    Continue hydroxychloroquine.  Please get her last eye exam report.    Methotrexate monitoring labs to be done now and then on follow-up    Follow-up in 3 months.

## 2021-08-27 NOTE — PROGRESS NOTES
Lolita Harris is a 46 year old who is being evaluated via a billable video visit.      How would you like to obtain your AVS? MyChart  If the video visit is dropped, the invitation should be resent by: Text to cell phone: [phone]  Will anyone else be joining your video visit? No      Subjective : Lolita is a 46-year-old female with past medical history of seropositive rheumatoid arthritis, cyclical neutropenia, hypothyroidism evaluated via billable virtual visit for management of rheumatoid arthritis.  She had a positive anti-CCP antibody in September 2018 but after that her rheumatoid serologies have been negative.  Her symptoms are very suggestive of rheumatoid arthritis.  She presented with symmetric arthralgias in her fingers, wrists, ankles, feet.  She is on Plaquenil 300 mg dose and methotrexate.  Dose of methotrexate was increased to 10 tablets in June 2020 but due to persistent headaches dose was reduced back to 8 tablets.  Humira was added in 8/2020.    Overall she has noticed improvement with combination of Humira, methotrexate and hydroxychloroquine.  Swelling over her hands has reduced.  She still have some stiffness but it is getting better.    Headaches are getting little bit better.  She has more discomfort in her hips and knees which usually occurs in the cold months.     August 27, 2021 - She is on methotrexate 15 mg once a week, Humira every other week and hydroxychloroquine 300 mg to 70% improvement in her symptoms.  Hand swelling has resolved.  She does not have difficulty making a fist opening jars.  She reports mild tenderness on her knuckles on putting pressure. When she ran out of fish oil supplements she noted worsening in her joint pains.  No side effects with the medication use.    Review of systems negative except for those mentioned above    Reviewed past medical, surgical, family history    Pertinent labs:   Component      Latest Ref Rng & Units 6/22/2021   WBC      4.0 - 11.0  10e9/L 5.3   RBC Count      3.8 - 5.2 10e12/L 4.37   Hemoglobin      11.7 - 15.7 g/dL 13.7   Hematocrit      35.0 - 47.0 % 41.8   MCV      78 - 100 fl 96   MCH      26.5 - 33.0 pg 31.4   MCHC      31.5 - 36.5 g/dL 32.8   RDW      10.0 - 15.0 % 12.6   Platelet Count      150 - 450 10e9/L 229   Creatinine      0.52 - 1.04 mg/dL 0.88   GFR Estimate      >60 mL/min/1.73:m2 79   GFR Estimate If Black      >60 mL/min/1.73:m2 >90   Sed Rate      0 - 20 mm/h 4   CRP Inflammation      0.0 - 8.0 mg/L <2.9   ALT      0 - 50 U/L 30   AST      0 - 45 U/L 22   Albumin      3.4 - 5.0 g/dL 3.9     Physical exam : Moderately built, appears stated age, cooperative  Musculoskeletal: Swelling of her hands improved.  Tenderness over MCP, PIP joints improved.  She can make a complete fist with her hands.  No tenderness reported over bilateral ankles, MTP joints.    Assessment :     Seropositive rheumatoid arthritis  Chronic leukopenia - better on Methotrexate   High risk medication use - Methotrexate, HCQ, Humira    Seropositive rheumatoid arthritis: She is on Humira 40 mg subcu every 2 weeks and methotrexate 6 tablets once a week and hydroxychloroquine 300 mg daily.  Overall her symptoms have improved.  Her headaches comes and goes. Her last methotrexate monitoring labs done in 6/2021 were normal.  We will continue same regimen.    High risk medication use: Methotrexate monitoring labs to be done now and then in 3 months on follow-up.  She had Plaquenil eye exam done this year.  Will get those records.    Vaccinations: Vaccinations reviewed with MsDavid Steven. Risks and benefits of vaccinations were discussed.  - Influenza: encouraged yearly vaccination  - Jvdkvoh00: 8/21/20  - Giehlornd00: 10/15/20  - Zostavax: after age 50  -COVID-19 : Migue - 3/9/21      Plan :     Continue Humira 40 mg subcu every 2 weeks    Continue methotrexate 6 tablets once a week  --q 3 mo AST/ALT, Albumin, CBC with plts  --Limit EtOH intake to 2 drinks weekly;  use folate 1 mg daily.  --Tylenol 500 mg tid prn nausea/HA associated with dosing.      Continue Plaquenil 2 tablets on Monday Wednesday Friday and 1 tablet rest of the days    Methotrexate monitoring labs to be done now and then on follow-up    Follow-up in 3 months.      Video-Visit Details    Type of service:  Video Visit    Video Start Time: 9:47 AM     Video End Time:10:05 AM    Originating Location (pt. Location): Home    Distant Location (provider location):  [facility]     Platform used for Video Visit: Ila Vargas, EMT  Clinic Support  [facility]    [phone]    Employed by [facility] [employer]

## 2021-09-09 ENCOUNTER — LAB (OUTPATIENT)
Dept: LAB | Facility: CLINIC | Age: 46
End: 2021-09-09
Payer: COMMERCIAL

## 2021-09-09 DIAGNOSIS — M05.79 RHEUMATOID ARTHRITIS, SEROPOSITIVE, MULTIPLE SITES (H): ICD-10-CM

## 2021-09-09 LAB
ALBUMIN SERPL-MCNC: 3.7 G/DL (ref 3.4–5)
ALT SERPL W P-5'-P-CCNC: 34 U/L (ref 0–50)
AST SERPL W P-5'-P-CCNC: 25 U/L (ref 0–45)
BASOPHILS # BLD AUTO: 0 10E3/UL (ref 0–0.2)
BASOPHILS NFR BLD AUTO: 1 %
CREAT SERPL-MCNC: 0.82 MG/DL (ref 0.52–1.04)
CRP SERPL-MCNC: <2.9 MG/L (ref 0–8)
EOSINOPHIL # BLD AUTO: 0 10E3/UL (ref 0–0.7)
EOSINOPHIL NFR BLD AUTO: 1 %
ERYTHROCYTE [DISTWIDTH] IN BLOOD BY AUTOMATED COUNT: 12.7 % (ref 10–15)
ERYTHROCYTE [SEDIMENTATION RATE] IN BLOOD BY WESTERGREN METHOD: 4 MM/HR (ref 0–20)
GFR SERPL CREATININE-BSD FRML MDRD: 86 ML/MIN/1.73M2
HCT VFR BLD AUTO: 40.6 % (ref 35–47)
HGB BLD-MCNC: 13.6 G/DL (ref 11.7–15.7)
IMM GRANULOCYTES # BLD: 0 10E3/UL
IMM GRANULOCYTES NFR BLD: 0 %
LYMPHOCYTES # BLD AUTO: 1.8 10E3/UL (ref 0.8–5.3)
LYMPHOCYTES NFR BLD AUTO: 46 %
MCH RBC QN AUTO: 31.9 PG (ref 26.5–33)
MCHC RBC AUTO-ENTMCNC: 33.5 G/DL (ref 31.5–36.5)
MCV RBC AUTO: 95 FL (ref 78–100)
MONOCYTES # BLD AUTO: 0.3 10E3/UL (ref 0–1.3)
MONOCYTES NFR BLD AUTO: 7 %
NEUTROPHILS # BLD AUTO: 1.8 10E3/UL (ref 1.6–8.3)
NEUTROPHILS NFR BLD AUTO: 45 %
NRBC # BLD AUTO: 0 10E3/UL
NRBC BLD AUTO-RTO: 0 /100
PLATELET # BLD AUTO: 197 10E3/UL (ref 150–450)
RBC # BLD AUTO: 4.26 10E6/UL (ref 3.8–5.2)
WBC # BLD AUTO: 3.9 10E3/UL (ref 4–11)

## 2021-09-09 PROCEDURE — 82040 ASSAY OF SERUM ALBUMIN: CPT

## 2021-09-09 PROCEDURE — 36415 COLL VENOUS BLD VENIPUNCTURE: CPT

## 2021-09-09 PROCEDURE — 84450 TRANSFERASE (AST) (SGOT): CPT

## 2021-09-09 PROCEDURE — 85025 COMPLETE CBC W/AUTO DIFF WBC: CPT

## 2021-09-09 PROCEDURE — 86140 C-REACTIVE PROTEIN: CPT

## 2021-09-09 PROCEDURE — 84460 ALANINE AMINO (ALT) (SGPT): CPT

## 2021-09-09 PROCEDURE — 85652 RBC SED RATE AUTOMATED: CPT

## 2021-09-09 PROCEDURE — 82565 ASSAY OF CREATININE: CPT

## 2021-09-15 ENCOUNTER — TELEPHONE (OUTPATIENT)
Dept: RHEUMATOLOGY | Facility: CLINIC | Age: 46
End: 2021-09-15

## 2021-09-15 NOTE — TELEPHONE ENCOUNTER
Prior Authorization Approval    Authorization Effective Date: 9/15/2021  Authorization Expiration Date: 9/15/2022  Medication: Humira  Approved Dose/Quantity: 2/28  Reference #: [id number]   Insurance Company: [employer] ([facility]) - Phone [phone] Fax [phone]  Expected CoPay: $0     CoPay Card Available: Yes    Foundation Assistance Needed:    Which Pharmacy is filling the prescription (Not needed for infusion/clinic administered): [facility] - [place], [place] - [address]  Pharmacy Notified: Yes  Patient Notified: Yes

## 2021-09-15 NOTE — TELEPHONE ENCOUNTER
[facility] Prior Authorization Team   Phone: [phone]  Fax: [phone]    PA Initiation    Medication: Humira  Insurance Company: [employer] ([facility]) - Phone [phone] Fax [phone]  Pharmacy Filling the Rx: [facility] - [place], [place] - [address]  Filling Pharmacy Phone: [phone]  Filling Pharmacy Fax: [phone]  Start Date: 9/15/2021

## 2021-09-18 ENCOUNTER — HEALTH MAINTENANCE LETTER (OUTPATIENT)
Age: 46
End: 2021-09-18

## 2021-10-19 DIAGNOSIS — M06.9 RHEUMATOID ARTHRITIS INVOLVING MULTIPLE SITES (H): ICD-10-CM

## 2021-10-20 RX ORDER — FOLIC ACID 1 MG/1
3 TABLET ORAL DAILY
Qty: 270 TABLET | Refills: 3 | Status: SHIPPED | OUTPATIENT
Start: 2021-10-20 | End: 2021-12-06

## 2021-11-13 ENCOUNTER — HEALTH MAINTENANCE LETTER (OUTPATIENT)
Age: 46
End: 2021-11-13

## 2021-11-19 NOTE — PROGRESS NOTES
Lolita is a 46 year old who is being evaluated via a billable video visit.      How would you like to obtain your AVS? MyChart  If the video visit is dropped, the invitation should be resent by: Text to cell phone: [phone]  Will anyone else be joining your video visit? No      Video Start Time: 11:05 AM    Subjective : Lolita is 46-year-old female with past medical history of seropositive rheumatoid arthritis, cyclical neutropenia, hypothyroidism evaluated via billable virtual visit for management of rheumatoid arthritis.  She had positive anti-CCP antibody in September 2018 but after that her rheumatoid serologies have been negative.  Her symptoms are very suggestive of rheumatoid arthritis.  She presented with symmetric arthralgias in her fingers, wrists, ankles, feet.  She is on Plaquenil 300 mg dose and methotrexate.  Dose of methotrexate was increased to 10 tablets in June 2020 but due to persistent headaches dose was reduced back to 8 tablets.  Humira was added in 8/2020.    Overall she has noticed improvement with combination of Humira, methotrexate and hydroxychloroquine.  Swelling over her hands has reduced.  She still have some stiffness but it is getting better.    Headaches are getting little bit better.  She has more discomfort in her hips and knees which usually occurs in the cold months.     August 27, 2021 - She is on methotrexate 15 mg once a week, Humira every other week and hydroxychloroquine 300 mg and has shown 70% improvement in her symptoms.  Hand swelling has resolved.  She does not have difficulty making a fist opening jars.  She reports mild tenderness on her knuckles on putting pressure. When she ran out of fish oil supplements she noted worsening in her joint pains.  No side effects with the medication use.    December 6, 2021 - She still feel pain in her joints. More in her feet, ankles. Once in a while in her hands. She is not on prednisone or NSAIDs on a daily basis. She is on  Humira 40 mg subcu every 2 weeks, methotrexate 15 mg once a week and hydroxychloroquine 300 mg daily. Methotrexate was reduced from 8 tablets to 6 tablets because of chronic headaches. She has not noticed any change after reducing methotrexate dose. She reports history of chronic migraine headaches even before starting methotrexate. She was hospitalized halloween weekend for diverticulitis and had gut perforation. She has colonoscopy scheduled next week. She does not have daily bowel movements and has history of chronic constipation for many years. She has tried multiple fiber supplements which did not help but make her bloated.    Review of systems negative except for those mentioned above    Reviewed past medical, surgical, family history    Pertinent labs:     Component      Latest Ref Rng & Units 12/3/2021   WBC      4.0 - 11.0 10e3/uL 3.1 (L)   RBC Count      3.80 - 5.20 10e6/uL 4.43   Hemoglobin      11.7 - 15.7 g/dL 14.2   Hematocrit      35.0 - 47.0 % 42.7   MCV      78 - 100 fL 96   MCH      26.5 - 33.0 pg 32.1   MCHC      31.5 - 36.5 g/dL 33.3   RDW      10.0 - 15.0 % 12.9   Platelet Count      150 - 450 10e3/uL 173   % Neutrophils      % 36   % Lymphocytes      % 52   % Monocytes      % 10   % Eosinophils      % 1   % Basophils      % 1   % Immature Granulocytes      % 0   NRBCs per 100 WBC      <1 /100 0   Absolute Neutrophils      1.6 - 8.3 10e3/uL 1.1 (L)   Absolute Lymphocytes      0.8 - 5.3 10e3/uL 1.6   Absolute Monocytes      0.0 - 1.3 10e3/uL 0.3   Absolute Eosinophils      0.0 - 0.7 10e3/uL 0.0   Absolute Basophils      0.0 - 0.2 10e3/uL 0.0   Absolute Immature Granulocytes      <=0.4 10e3/uL 0.0   Absolute NRBCs      10e3/uL 0.0   Creatinine      0.52 - 1.04 mg/dL 0.75   GFR Estimate      >60 mL/min/1.73m2 >90   Sed Rate      0 - 20 mm/hr 5   CRP Inflammation      0.0 - 8.0 mg/L <2.9   Albumin      3.4 - 5.0 g/dL 3.9   ALT      0 - 50 U/L 48   AST      0 - 45 U/L 35     Physical exam :  Moderately built, appears stated age, cooperative  Musculoskeletal: Swelling of her hands improved.  Tenderness over MCP, PIP joints improved.  She can make a complete fist with her hands.  No tenderness reported over bilateral ankles, MTP joints.    Assessment :     Seropositive rheumatoid arthritis  Chronic leukopenia   High risk medication use - Methotrexate, HCQ, Humira  Recent episode of diverticulitis and get perforation-10/2021.  History of chronic constipation    Seropositive rheumatoid arthritis: She is on Humira 40 mg subcu every 2 weeks and methotrexate 6 tablets once a week and hydroxychloroquine 300 mg daily.  Overall her symptoms have improved but still reports pain in her ankles, feet, occasionally hands.  Her headaches comes and goes. Her last methotrexate monitoring labs done on 12/3/21 were normal. I will increase methotrexate to 7 tablets and then to 8 tablets once a week. Methotrexate monitoring labs will be done after increasing the dose.    High risk medication use: Methotrexate monitoring labs to be done in a month after increasing methotrexate dose and then every 3 months. She is due for Plaquenil eye exam, eye exam form will be sent.     Vaccinations: Vaccinations reviewed with Ms. Murilloon. Risks and benefits of vaccinations were discussed.  - Influenza: encouraged yearly vaccination  - Riimvtw22: 8/21/20  - Ldxwnmvyp17: 10/15/20  - Zostavax: after age 50  -COVID-19 : Migue - 3/9/21, Moderna - 11/30/21      Plan :     Continue Humira 40 mg subcu every 2 weeks    Continue methotrexate 6 tablets once a week. After colonoscopy procedure we can increase methotrexate to 7 tablets and then to 8 tablets once a week    After increasing methotrexate we will do monitoring labs in 1 month    Continue hydroxychloroquine. Please send her eye exam form.    Follow-up in 3 months.      Video-Visit Details    Type of service:  Video Visit    Video End Time: 11:26 AM    Originating Location (pt. Location):  Home    Distant Location (provider location):  [facility]     Platform used for Video Visit: Ila Menezes MD  [facility]  Department of Rheumatology & Autoimmune Disorders  [facility]: [phone]  Pager - [phone]

## 2021-12-03 ENCOUNTER — LAB (OUTPATIENT)
Dept: LAB | Facility: CLINIC | Age: 46
End: 2021-12-03
Payer: COMMERCIAL

## 2021-12-03 DIAGNOSIS — M05.79 RHEUMATOID ARTHRITIS, SEROPOSITIVE, MULTIPLE SITES (H): ICD-10-CM

## 2021-12-03 LAB
ALBUMIN SERPL-MCNC: 3.9 G/DL (ref 3.4–5)
ALT SERPL W P-5'-P-CCNC: 48 U/L (ref 0–50)
AST SERPL W P-5'-P-CCNC: 35 U/L (ref 0–45)
BASOPHILS # BLD AUTO: 0 10E3/UL (ref 0–0.2)
BASOPHILS NFR BLD AUTO: 1 %
CREAT SERPL-MCNC: 0.75 MG/DL (ref 0.52–1.04)
CRP SERPL-MCNC: <2.9 MG/L (ref 0–8)
EOSINOPHIL # BLD AUTO: 0 10E3/UL (ref 0–0.7)
EOSINOPHIL NFR BLD AUTO: 1 %
ERYTHROCYTE [DISTWIDTH] IN BLOOD BY AUTOMATED COUNT: 12.9 % (ref 10–15)
ERYTHROCYTE [SEDIMENTATION RATE] IN BLOOD BY WESTERGREN METHOD: 5 MM/HR (ref 0–20)
GFR SERPL CREATININE-BSD FRML MDRD: >90 ML/MIN/1.73M2
HCT VFR BLD AUTO: 42.7 % (ref 35–47)
HGB BLD-MCNC: 14.2 G/DL (ref 11.7–15.7)
IMM GRANULOCYTES # BLD: 0 10E3/UL
IMM GRANULOCYTES NFR BLD: 0 %
LYMPHOCYTES # BLD AUTO: 1.6 10E3/UL (ref 0.8–5.3)
LYMPHOCYTES NFR BLD AUTO: 52 %
MCH RBC QN AUTO: 32.1 PG (ref 26.5–33)
MCHC RBC AUTO-ENTMCNC: 33.3 G/DL (ref 31.5–36.5)
MCV RBC AUTO: 96 FL (ref 78–100)
MONOCYTES # BLD AUTO: 0.3 10E3/UL (ref 0–1.3)
MONOCYTES NFR BLD AUTO: 10 %
NEUTROPHILS # BLD AUTO: 1.1 10E3/UL (ref 1.6–8.3)
NEUTROPHILS NFR BLD AUTO: 36 %
NRBC # BLD AUTO: 0 10E3/UL
NRBC BLD AUTO-RTO: 0 /100
PLATELET # BLD AUTO: 173 10E3/UL (ref 150–450)
RBC # BLD AUTO: 4.43 10E6/UL (ref 3.8–5.2)
WBC # BLD AUTO: 3.1 10E3/UL (ref 4–11)

## 2021-12-03 PROCEDURE — 82040 ASSAY OF SERUM ALBUMIN: CPT

## 2021-12-03 PROCEDURE — 85652 RBC SED RATE AUTOMATED: CPT

## 2021-12-03 PROCEDURE — 86140 C-REACTIVE PROTEIN: CPT

## 2021-12-03 PROCEDURE — 82565 ASSAY OF CREATININE: CPT

## 2021-12-03 PROCEDURE — 85025 COMPLETE CBC W/AUTO DIFF WBC: CPT

## 2021-12-03 PROCEDURE — 84450 TRANSFERASE (AST) (SGOT): CPT

## 2021-12-03 PROCEDURE — 84460 ALANINE AMINO (ALT) (SGPT): CPT

## 2021-12-03 PROCEDURE — 36415 COLL VENOUS BLD VENIPUNCTURE: CPT

## 2021-12-06 ENCOUNTER — VIRTUAL VISIT (OUTPATIENT)
Dept: RHEUMATOLOGY | Facility: CLINIC | Age: 46
End: 2021-12-06
Payer: COMMERCIAL

## 2021-12-06 DIAGNOSIS — Z79.899 HIGH RISK MEDICATION USE: ICD-10-CM

## 2021-12-06 DIAGNOSIS — M05.79 RHEUMATOID ARTHRITIS, SEROPOSITIVE, MULTIPLE SITES (H): Primary | ICD-10-CM

## 2021-12-06 DIAGNOSIS — D70.9 NEUTROPENIA, UNSPECIFIED TYPE (H): ICD-10-CM

## 2021-12-06 PROCEDURE — 99214 OFFICE O/P EST MOD 30 MIN: CPT | Mod: GT | Performed by: STUDENT IN AN ORGANIZED HEALTH CARE EDUCATION/TRAINING PROGRAM

## 2021-12-06 RX ORDER — HYDROXYCHLOROQUINE SULFATE 200 MG/1
TABLET, FILM COATED ORAL
Qty: 130 TABLET | Refills: 1 | Status: SHIPPED | OUTPATIENT
Start: 2021-12-06 | End: 2022-07-21

## 2021-12-06 RX ORDER — FOLIC ACID 1 MG/1
4 TABLET ORAL DAILY
Qty: 270 TABLET | Refills: 3 | Status: SHIPPED | OUTPATIENT
Start: 2021-12-06 | End: 2022-11-09

## 2021-12-06 NOTE — RESULT ENCOUNTER NOTE
Results discussed with the patient at the time of visit.     Alejandro Menezes MD   12/6/2021  11:08 AM

## 2021-12-06 NOTE — PATIENT INSTRUCTIONS
Continue Humira 40 mg subcu every 2 weeks    Continue methotrexate 6 tablets once a week. After colonoscopy procedure we can increase methotrexate to 7 tablets and then to 8 tablets once a week    After increasing methotrexate we will do monitoring labs in 1 month    Continue hydroxychloroquine. Please send her eye exam form.    Follow-up in 3 months.

## 2022-01-08 ENCOUNTER — HEALTH MAINTENANCE LETTER (OUTPATIENT)
Age: 47
End: 2022-01-08

## 2022-03-10 ENCOUNTER — LAB (OUTPATIENT)
Dept: LAB | Facility: CLINIC | Age: 47
End: 2022-03-10
Payer: COMMERCIAL

## 2022-03-10 DIAGNOSIS — Z79.899 HIGH RISK MEDICATION USE: ICD-10-CM

## 2022-03-10 DIAGNOSIS — M05.79 RHEUMATOID ARTHRITIS, SEROPOSITIVE, MULTIPLE SITES (H): ICD-10-CM

## 2022-03-10 DIAGNOSIS — D70.9 NEUTROPENIA, UNSPECIFIED TYPE (H): ICD-10-CM

## 2022-03-10 LAB
ALBUMIN SERPL-MCNC: 3.9 G/DL (ref 3.4–5)
ALT SERPL W P-5'-P-CCNC: 35 U/L (ref 0–50)
AST SERPL W P-5'-P-CCNC: 27 U/L (ref 0–45)
CREAT SERPL-MCNC: 0.77 MG/DL (ref 0.52–1.04)
CRP SERPL-MCNC: <2.9 MG/L (ref 0–8)
ERYTHROCYTE [DISTWIDTH] IN BLOOD BY AUTOMATED COUNT: 12.9 % (ref 10–15)
ERYTHROCYTE [SEDIMENTATION RATE] IN BLOOD BY WESTERGREN METHOD: 5 MM/HR (ref 0–20)
GFR SERPL CREATININE-BSD FRML MDRD: >90 ML/MIN/1.73M2
HCT VFR BLD AUTO: 42.1 % (ref 35–47)
HGB BLD-MCNC: 13.9 G/DL (ref 11.7–15.7)
MCH RBC QN AUTO: 31.7 PG (ref 26.5–33)
MCHC RBC AUTO-ENTMCNC: 33 G/DL (ref 31.5–36.5)
MCV RBC AUTO: 96 FL (ref 78–100)
PLATELET # BLD AUTO: 211 10E3/UL (ref 150–450)
RBC # BLD AUTO: 4.38 10E6/UL (ref 3.8–5.2)
WBC # BLD AUTO: 3.4 10E3/UL (ref 4–11)

## 2022-03-10 PROCEDURE — 85652 RBC SED RATE AUTOMATED: CPT

## 2022-03-10 PROCEDURE — 84450 TRANSFERASE (AST) (SGOT): CPT

## 2022-03-10 PROCEDURE — 86140 C-REACTIVE PROTEIN: CPT

## 2022-03-10 PROCEDURE — 82565 ASSAY OF CREATININE: CPT

## 2022-03-10 PROCEDURE — 85027 COMPLETE CBC AUTOMATED: CPT

## 2022-03-10 PROCEDURE — 82040 ASSAY OF SERUM ALBUMIN: CPT

## 2022-03-10 PROCEDURE — 84460 ALANINE AMINO (ALT) (SGPT): CPT

## 2022-03-10 PROCEDURE — 36415 COLL VENOUS BLD VENIPUNCTURE: CPT

## 2022-04-05 DIAGNOSIS — M05.79 RHEUMATOID ARTHRITIS, SEROPOSITIVE, MULTIPLE SITES (H): ICD-10-CM

## 2022-04-07 ENCOUNTER — MYC REFILL (OUTPATIENT)
Dept: RHEUMATOLOGY | Facility: CLINIC | Age: 47
End: 2022-04-07
Payer: COMMERCIAL

## 2022-04-07 DIAGNOSIS — M05.79 RHEUMATOID ARTHRITIS, SEROPOSITIVE, MULTIPLE SITES (H): ICD-10-CM

## 2022-04-07 NOTE — TELEPHONE ENCOUNTER
Methotrexate 2.5 MG Oral Tablet      Last Written Prescription Date:  12-6-2021  Last Fill Quantity: 96,   # refills: 0  Last Office Visit: 12-6-2021  Future Office visit:  6-    CBC RESULTS: Recent Labs   Lab Test 03/10/22  1108   WBC 3.4*   RBC 4.38   HGB 13.9   HCT 42.1   MCV 96   MCH 31.7   MCHC 33.0   RDW 12.9          Creatinine   Date Value Ref Range Status   03/10/2022 0.77 0.52 - 1.04 mg/dL Final   06/22/2021 0.88 0.52 - 1.04 mg/dL Final   ]    Liver Function Studies -   Recent Labs   Lab Test 03/10/22  1108   ALBUMIN 3.9   AST 27   ALT 35       Routing refill request to provider for review/approval because:  Not on protocol.        Kathleen M Doege RN

## 2022-05-26 NOTE — PROGRESS NOTES
Lolita is a 47 year old who is being evaluated via a billable video visit.      How would you like to obtain your AVS? MyChart  If the video visit is dropped, the invitation should be resent by: Text to cell phone: [phone]  Will anyone else be joining your video visit? No      Video Start Time: 9:40 AM    Subjective : Lolita is 47-year-old female with past medical history of seropositive rheumatoid arthritis, cyclical neutropenia, hypothyroidism evaluated via billable virtual visit for management of rheumatoid arthritis.  She had positive anti-CCP antibody in September 2018 but after that her rheumatoid serologies have been negative.  Her symptoms are very suggestive of rheumatoid arthritis.  She presented with symmetric arthralgias in her fingers, wrists, ankles, feet.  She is on Plaquenil 300 mg dose and methotrexate.  Dose of methotrexate was increased to 10 tablets in June 2020 but due to persistent headaches dose was reduced back to 8 tablets.  Humira was added in 8/2020.    Overall she has noticed improvement with combination of Humira, methotrexate and hydroxychloroquine.  Swelling over her hands has reduced.  She still have some stiffness but it is getting better.    Headaches are getting little bit better.  She has more discomfort in her hips and knees which usually occurs in the cold months.     August 27, 2021 - She is on methotrexate 15 mg once a week, Humira every other week and hydroxychloroquine 300 mg and has shown 70% improvement in her symptoms.  Hand swelling has resolved.  She does not have difficulty making a fist opening jars.  She reports mild tenderness on her knuckles on putting pressure. When she ran out of fish oil supplements she noted worsening in her joint pains.  No side effects with the medication use.    December 6, 2021 - She still feel pain in her joints. More in her feet, ankles. Once in a while in her hands. She is not on prednisone or NSAIDs on a daily basis. She is on  Humira 40 mg subcu every 2 weeks, methotrexate 15 mg once a week and hydroxychloroquine 300 mg daily. Methotrexate was reduced from 8 tablets to 6 tablets because of chronic headaches. She has not noticed any change after reducing methotrexate dose. She reports history of chronic migraine headaches even before starting methotrexate. She was hospitalized halloween weekend for diverticulitis and had gut perforation. She has colonoscopy scheduled next week. She does not have daily bowel movements and has history of chronic constipation for many years. She has tried multiple fiber supplements which did not help but make her bloated.    June 21, 2022 - She still has chronic constipation. Overall RA symptoms are good. Her hands are little swollen today with soreness in her right hand. She also has chronic headaches and use Naratriptan. Her eye exam in 3/2022 was normal.  She is on Humira every 2 weeks, methotrexate 8 tablets once a week and hydroxychloroquine 400 mg Monday Wednesday Friday and 200 mg rest of the days.  She does not know how much methotrexate and hydroxychloroquine help but she has noticed that Humira helps her a lot with her joint pains.  When she is due for her Humira shot she feels more pain and tenderness in her joints.    Review of systems negative except for those mentioned above    Reviewed past medical, surgical, family history    Pertinent labs:     Component      Latest Ref Rng & Units 6/8/2022 6/8/2022 6/8/2022 6/8/2022          10:32 AM 10:32 AM 10:32 AM 10:32 AM   WBC      4.0 - 11.0 10e3/uL 3.9 (L)      RBC Count      3.80 - 5.20 10e6/uL 4.27      Hemoglobin      11.7 - 15.7 g/dL 13.7      Hematocrit      35.0 - 47.0 % 40.8      MCV      78 - 100 fL 96      MCH      26.5 - 33.0 pg 32.1      MCHC      31.5 - 36.5 g/dL 33.6      RDW      10.0 - 15.0 % 12.6      Platelet Count      150 - 450 10e3/uL 192      Creatinine      0.52 - 1.04 mg/dL       GFR Estimate      >60 mL/min/1.73m2       AST       0 - 45 U/L    27   ALT      0 - 50 U/L       Albumin      3.4 - 5.0 g/dL       CRP Inflammation      0.0 - 8.0 mg/L   <2.9    Sed Rate      0 - 20 mm/hr  4       Component      Latest Ref Rng & Units 6/8/2022 6/8/2022 6/8/2022          10:32 AM 10:32 AM 10:32 AM   WBC      4.0 - 11.0 10e3/uL      RBC Count      3.80 - 5.20 10e6/uL      Hemoglobin      11.7 - 15.7 g/dL      Hematocrit      35.0 - 47.0 %      MCV      78 - 100 fL      MCH      26.5 - 33.0 pg      MCHC      31.5 - 36.5 g/dL      RDW      10.0 - 15.0 %      Platelet Count      150 - 450 10e3/uL      Creatinine      0.52 - 1.04 mg/dL  0.79    GFR Estimate      >60 mL/min/1.73m2  >90    AST      0 - 45 U/L      ALT      0 - 50 U/L   35   Albumin      3.4 - 5.0 g/dL 3.8     CRP Inflammation      0.0 - 8.0 mg/L      Sed Rate      0 - 20 mm/hr          Physical exam : Moderately built, appears stated age, cooperative  Musculoskeletal: Slight swelling over right hand noted. No tenderness or synovitis reported over MCP, PIP, DIP joints, B/l wrists, ankles. She can make a complete fist with her hands.     Assessment :     Seropositive rheumatoid arthritis  Chronic leukopenia   High risk medication use - Methotrexate, HCQ, Humira  Chronic constipation  Episode of diverticulitis and get perforation-10/2021.    Seropositive rheumatoid arthritis: She is on Humira 40 mg subcu every 2 weeks and methotrexate 6 tablets once a week and hydroxychloroquine 300 mg daily.  Overall her symptoms are stable.  Denies any major flares.  She has not noticed any significant change on increasing methotrexate from 6 to 8 tablets.  Denies any side effects with medication.  She has noticed improvement on Humira.  Her headaches comes and goes.  She does not think her headache is related to methotrexate.    We will continue same regimen.  Since she has not noticed much difference on increasing methotrexate from 6 to 8 tablets, will reduce methotrexate to 6 tablets once a week.   Continue hydroxychloroquine and Humira.    High risk medication use: Methotrexate monitoring labs done on 6/8/22 showed normal CBC, liver, kidney function test, ESR, CRP.  Her Plaquenil eye exam done in 3/2022 was normal.    Vaccinations: Vaccinations reviewed with Ms. Harris. Risks and benefits of vaccinations were discussed.  - Influenza: encouraged yearly vaccination  - Zizvpjv30: 8/21/20  - Phzdvfdlp10: 10/15/20  - Zostavax: after age 50  -COVID-19 : Migue - 3/9/21, Moderna - 11/30/21      Plan :     Continue Humira 40 mg subcu every 2 weeks    Continue hydroxychloroquine    Continue methotrexate, will reduce to 6 tablets once a week.    Labs every 3 months    Follow-up in 6 months      Video-Visit Details    Type of service:  Video Visit    Video End Time:  09:58 am     Originating Location (pt. Location): Home    Distant Location (provider location):  [facility]     Platform used for Video Visit: Ila Menezes MD  [facility]  Department of Rheumatology & Autoimmune Disorders  [facility]: [phone]  Pager - [phone]

## 2022-06-08 ENCOUNTER — LAB (OUTPATIENT)
Dept: LAB | Facility: CLINIC | Age: 47
End: 2022-06-08
Payer: COMMERCIAL

## 2022-06-08 DIAGNOSIS — D70.9 NEUTROPENIA, UNSPECIFIED TYPE (H): ICD-10-CM

## 2022-06-08 DIAGNOSIS — Z79.899 HIGH RISK MEDICATION USE: ICD-10-CM

## 2022-06-08 DIAGNOSIS — M05.79 RHEUMATOID ARTHRITIS, SEROPOSITIVE, MULTIPLE SITES (H): ICD-10-CM

## 2022-06-08 LAB
ALBUMIN SERPL-MCNC: 3.8 G/DL (ref 3.4–5)
ALT SERPL W P-5'-P-CCNC: 35 U/L (ref 0–50)
AST SERPL W P-5'-P-CCNC: 27 U/L (ref 0–45)
CREAT SERPL-MCNC: 0.79 MG/DL (ref 0.52–1.04)
CRP SERPL-MCNC: <2.9 MG/L (ref 0–8)
ERYTHROCYTE [DISTWIDTH] IN BLOOD BY AUTOMATED COUNT: 12.6 % (ref 10–15)
ERYTHROCYTE [SEDIMENTATION RATE] IN BLOOD BY WESTERGREN METHOD: 4 MM/HR (ref 0–20)
GFR SERPL CREATININE-BSD FRML MDRD: >90 ML/MIN/1.73M2
HCT VFR BLD AUTO: 40.8 % (ref 35–47)
HGB BLD-MCNC: 13.7 G/DL (ref 11.7–15.7)
MCH RBC QN AUTO: 32.1 PG (ref 26.5–33)
MCHC RBC AUTO-ENTMCNC: 33.6 G/DL (ref 31.5–36.5)
MCV RBC AUTO: 96 FL (ref 78–100)
PLATELET # BLD AUTO: 192 10E3/UL (ref 150–450)
RBC # BLD AUTO: 4.27 10E6/UL (ref 3.8–5.2)
WBC # BLD AUTO: 3.9 10E3/UL (ref 4–11)

## 2022-06-08 PROCEDURE — 84460 ALANINE AMINO (ALT) (SGPT): CPT

## 2022-06-08 PROCEDURE — 84450 TRANSFERASE (AST) (SGOT): CPT

## 2022-06-08 PROCEDURE — 36415 COLL VENOUS BLD VENIPUNCTURE: CPT

## 2022-06-08 PROCEDURE — 85652 RBC SED RATE AUTOMATED: CPT

## 2022-06-08 PROCEDURE — 82565 ASSAY OF CREATININE: CPT

## 2022-06-08 PROCEDURE — 86140 C-REACTIVE PROTEIN: CPT

## 2022-06-08 PROCEDURE — 85027 COMPLETE CBC AUTOMATED: CPT

## 2022-06-08 PROCEDURE — 82040 ASSAY OF SERUM ALBUMIN: CPT

## 2022-06-20 ENCOUNTER — TELEPHONE (OUTPATIENT)
Dept: RHEUMATOLOGY | Facility: CLINIC | Age: 47
End: 2022-06-20
Payer: COMMERCIAL

## 2022-06-20 NOTE — TELEPHONE ENCOUNTER
Prior Authorization Approval    Authorization Effective Date:    Authorization Expiration Date: 6/20/2023  Medication: HUMIRA  Approved Dose/Quantity: 2 FOR 28 DAYS  Reference #:     Insurance Company: [employer] ([employer]) - Phone [phone] Fax [phone]  Expected CoPay:       CoPay Card Available:      Foundation Assistance Needed:    Which Pharmacy is filling the prescription (Not needed for infusion/clinic administered): [facility] - [place], [place] - [address]  Pharmacy Notified: Yes  Patient Notified: Yes

## 2022-06-21 ENCOUNTER — VIRTUAL VISIT (OUTPATIENT)
Dept: RHEUMATOLOGY | Facility: CLINIC | Age: 47
End: 2022-06-21
Payer: COMMERCIAL

## 2022-06-21 DIAGNOSIS — M05.79 RHEUMATOID ARTHRITIS, SEROPOSITIVE, MULTIPLE SITES (H): Primary | ICD-10-CM

## 2022-06-21 DIAGNOSIS — Z79.899 HIGH RISK MEDICATION USE: ICD-10-CM

## 2022-06-21 PROCEDURE — 99214 OFFICE O/P EST MOD 30 MIN: CPT | Mod: GT | Performed by: STUDENT IN AN ORGANIZED HEALTH CARE EDUCATION/TRAINING PROGRAM

## 2022-06-21 NOTE — PATIENT INSTRUCTIONS
Continue Humira 40 mg subcu every 2 weeks    Continue hydroxychloroquine    Continue methotrexate, will reduce to 6 tablets once a week.    Labs every 3 months    Follow-up in 6 months

## 2022-06-21 NOTE — RESULT ENCOUNTER NOTE
Results discussed with the patient at the time of visit.     Alejandro Menezes MD   6/21/2022  9:42 AM

## 2022-07-18 DIAGNOSIS — Z79.899 HIGH RISK MEDICATION USE: ICD-10-CM

## 2022-07-18 DIAGNOSIS — M05.79 RHEUMATOID ARTHRITIS, SEROPOSITIVE, MULTIPLE SITES (H): ICD-10-CM

## 2022-07-18 DIAGNOSIS — D70.9 NEUTROPENIA, UNSPECIFIED TYPE (H): ICD-10-CM

## 2022-07-21 RX ORDER — HYDROXYCHLOROQUINE SULFATE 200 MG/1
TABLET, FILM COATED ORAL
Qty: 130 TABLET | Refills: 3 | Status: SHIPPED | OUTPATIENT
Start: 2022-07-21

## 2022-07-21 NOTE — TELEPHONE ENCOUNTER
Hydroxychloroquine Sulfate 200 MG Oral Tablet  Plaquenil      Last Written Prescription Date:   12/6/2021  Last Fill Quantity: 130,   # refills: 1  Last Office Visit:  6/21/2022  Future Office visit:  12/27/2022  Last Eye Exam: Last eye exam 11/2/2022  [facility]  130 Tabs, 3 refill sent to pharm 7/21/2022      Krystal Toro RN  Central Triage Red Flags/Med Refills

## 2022-07-29 DIAGNOSIS — M05.79 RHEUMATOID ARTHRITIS, SEROPOSITIVE, MULTIPLE SITES (H): ICD-10-CM

## 2022-08-01 NOTE — TELEPHONE ENCOUNTER
Medication/Dose: methotrexate 2.5 MG tablet    Last Written : 4/9/22  Last Quantity: 96, # refills: 0  Last Office Visit :  6/21/22  Pending appointment: 12/27/22       WBC   Date Value Ref Range Status   06/22/2021 5.3 4.0 - 11.0 10e9/L Final     WBC Count   Date Value Ref Range Status   06/08/2022 3.9 (L) 4.0 - 11.0 10e3/uL Final     RBC Count   Date Value Ref Range Status   06/08/2022 4.27 3.80 - 5.20 10e6/uL Final   06/22/2021 4.37 3.8 - 5.2 10e12/L Final     Hemoglobin   Date Value Ref Range Status   06/08/2022 13.7 11.7 - 15.7 g/dL Final   06/22/2021 13.7 11.7 - 15.7 g/dL Final     Hematocrit   Date Value Ref Range Status   06/08/2022 40.8 35.0 - 47.0 % Final   06/22/2021 41.8 35.0 - 47.0 % Final     MCV   Date Value Ref Range Status   06/08/2022 96 78 - 100 fL Final   06/22/2021 96 78 - 100 fl Final     MCH   Date Value Ref Range Status   06/08/2022 32.1 26.5 - 33.0 pg Final   06/22/2021 31.4 26.5 - 33.0 pg Final     MCHC   Date Value Ref Range Status   06/08/2022 33.6 31.5 - 36.5 g/dL Final   06/22/2021 32.8 31.5 - 36.5 g/dL Final     RDW   Date Value Ref Range Status   06/08/2022 12.6 10.0 - 15.0 % Final   06/22/2021 12.6 10.0 - 15.0 % Final     Platelet Count   Date Value Ref Range Status   06/08/2022 192 150 - 450 10e3/uL Final   06/22/2021 229 150 - 450 10e9/L Final     AST   Date Value Ref Range Status   06/08/2022 27 0 - 45 U/L Final   06/22/2021 22 0 - 45 U/L Final     ALT   Date Value Ref Range Status   06/08/2022 35 0 - 50 U/L Final   06/22/2021 30 0 - 50 U/L Final     Creatinine   Date Value Ref Range Status   06/08/2022 0.79 0.52 - 1.04 mg/dL Final   06/22/2021 0.88 0.52 - 1.04 mg/dL Final     Albumin   Date Value Ref Range Status   06/08/2022 3.8 3.4 - 5.0 g/dL Final   06/22/2021 3.9 3.4 - 5.0 g/dL Final     CRP Inflammation   Date Value Ref Range Status   06/08/2022 <2.9 0.0 - 8.0 mg/L Final   06/22/2021 <2.9 0.0 - 8.0 mg/L Final     No components found for: ESR      Prescription set up and  routed to provider per Refill Protocol.    Jesse Julien, LAKEN, RN

## 2022-09-27 ENCOUNTER — LAB (OUTPATIENT)
Dept: LAB | Facility: CLINIC | Age: 47
End: 2022-09-27
Payer: COMMERCIAL

## 2022-09-27 DIAGNOSIS — Z79.899 HIGH RISK MEDICATION USE: ICD-10-CM

## 2022-09-27 DIAGNOSIS — D70.9 NEUTROPENIA, UNSPECIFIED TYPE (H): ICD-10-CM

## 2022-09-27 DIAGNOSIS — M05.79 RHEUMATOID ARTHRITIS, SEROPOSITIVE, MULTIPLE SITES (H): ICD-10-CM

## 2022-09-27 LAB
ALBUMIN SERPL-MCNC: 3.7 G/DL (ref 3.4–5)
ALT SERPL W P-5'-P-CCNC: 26 U/L (ref 0–50)
AST SERPL W P-5'-P-CCNC: 21 U/L (ref 0–45)
CREAT SERPL-MCNC: 0.75 MG/DL (ref 0.52–1.04)
CRP SERPL-MCNC: <2.9 MG/L (ref 0–8)
ERYTHROCYTE [DISTWIDTH] IN BLOOD BY AUTOMATED COUNT: 12.2 % (ref 10–15)
ERYTHROCYTE [SEDIMENTATION RATE] IN BLOOD BY WESTERGREN METHOD: 5 MM/HR (ref 0–20)
GFR SERPL CREATININE-BSD FRML MDRD: >90 ML/MIN/1.73M2
HCT VFR BLD AUTO: 39.5 % (ref 35–47)
HGB BLD-MCNC: 13.6 G/DL (ref 11.7–15.7)
MCH RBC QN AUTO: 31.6 PG (ref 26.5–33)
MCHC RBC AUTO-ENTMCNC: 34.4 G/DL (ref 31.5–36.5)
MCV RBC AUTO: 92 FL (ref 78–100)
PLATELET # BLD AUTO: 182 10E3/UL (ref 150–450)
RBC # BLD AUTO: 4.3 10E6/UL (ref 3.8–5.2)
WBC # BLD AUTO: 3.2 10E3/UL (ref 4–11)

## 2022-09-27 PROCEDURE — 36415 COLL VENOUS BLD VENIPUNCTURE: CPT

## 2022-09-27 PROCEDURE — 85652 RBC SED RATE AUTOMATED: CPT

## 2022-09-27 PROCEDURE — 82565 ASSAY OF CREATININE: CPT

## 2022-09-27 PROCEDURE — 84450 TRANSFERASE (AST) (SGOT): CPT

## 2022-09-27 PROCEDURE — 82040 ASSAY OF SERUM ALBUMIN: CPT

## 2022-09-27 PROCEDURE — 85027 COMPLETE CBC AUTOMATED: CPT

## 2022-09-27 PROCEDURE — 86140 C-REACTIVE PROTEIN: CPT

## 2022-09-27 PROCEDURE — 84460 ALANINE AMINO (ALT) (SGPT): CPT

## 2022-10-25 DIAGNOSIS — M05.79 RHEUMATOID ARTHRITIS, SEROPOSITIVE, MULTIPLE SITES (H): ICD-10-CM

## 2022-10-29 NOTE — TELEPHONE ENCOUNTER
HUMIRA PEN *CF* 40MG/0.4ML      Last Written Prescription Date:  12/6/21  Last Fill Quantity: 1 each,   # refills: 5  Last Office Visit : 6/21/22  Future Office visit:  1/31/23    Routing refill request to provider for review/approval because:  Gap in therapy?  Taking?

## 2022-10-31 NOTE — TELEPHONE ENCOUNTER
[employer] message sent to patient to verify is she is still using Humira.      LUKE Mohr  Rheumatology/Infectious disease  [facility]   [phone]

## 2022-11-04 RX ORDER — ADALIMUMAB 40MG/0.4ML
40 KIT SUBCUTANEOUS
Qty: 1 EACH | Refills: 5 | Status: SHIPPED | OUTPATIENT
Start: 2022-11-04

## 2022-11-04 NOTE — TELEPHONE ENCOUNTER
Patient is still currently using Humira mediation. Will need a new script. Please send to pharmacy.      LUKE Mohr  Rheumatology/Infectious disease  [facility]   [phone]

## 2022-11-07 DIAGNOSIS — D70.9 NEUTROPENIA, UNSPECIFIED TYPE (H): ICD-10-CM

## 2022-11-07 DIAGNOSIS — Z79.899 HIGH RISK MEDICATION USE: ICD-10-CM

## 2022-11-07 DIAGNOSIS — M05.79 RHEUMATOID ARTHRITIS, SEROPOSITIVE, MULTIPLE SITES (H): ICD-10-CM

## 2022-11-09 RX ORDER — FOLIC ACID 1 MG/1
4000 TABLET ORAL DAILY
Qty: 360 TABLET | Refills: 3 | Status: SHIPPED | OUTPATIENT
Start: 2022-11-09

## 2022-11-20 ENCOUNTER — HEALTH MAINTENANCE LETTER (OUTPATIENT)
Age: 47
End: 2022-11-20

## 2022-12-23 ENCOUNTER — LAB (OUTPATIENT)
Dept: LAB | Facility: CLINIC | Age: 47
End: 2022-12-23
Payer: COMMERCIAL

## 2022-12-23 DIAGNOSIS — M05.79 RHEUMATOID ARTHRITIS, SEROPOSITIVE, MULTIPLE SITES (H): ICD-10-CM

## 2022-12-23 DIAGNOSIS — Z79.899 HIGH RISK MEDICATION USE: ICD-10-CM

## 2022-12-23 LAB
ALBUMIN SERPL-MCNC: 3.7 G/DL (ref 3.4–5)
ALT SERPL W P-5'-P-CCNC: 45 U/L (ref 0–50)
AST SERPL W P-5'-P-CCNC: 38 U/L (ref 0–45)
CREAT SERPL-MCNC: 0.79 MG/DL (ref 0.52–1.04)
CRP SERPL-MCNC: <2.9 MG/L (ref 0–8)
ERYTHROCYTE [DISTWIDTH] IN BLOOD BY AUTOMATED COUNT: 12.6 % (ref 10–15)
ERYTHROCYTE [SEDIMENTATION RATE] IN BLOOD BY WESTERGREN METHOD: 4 MM/HR (ref 0–20)
GFR SERPL CREATININE-BSD FRML MDRD: >90 ML/MIN/1.73M2
HCT VFR BLD AUTO: 41.1 % (ref 35–47)
HGB BLD-MCNC: 13.6 G/DL (ref 11.7–15.7)
MCH RBC QN AUTO: 31.6 PG (ref 26.5–33)
MCHC RBC AUTO-ENTMCNC: 33.1 G/DL (ref 31.5–36.5)
MCV RBC AUTO: 96 FL (ref 78–100)
PLATELET # BLD AUTO: 188 10E3/UL (ref 150–450)
RBC # BLD AUTO: 4.3 10E6/UL (ref 3.8–5.2)
WBC # BLD AUTO: 3.2 10E3/UL (ref 4–11)

## 2022-12-23 PROCEDURE — 86140 C-REACTIVE PROTEIN: CPT

## 2022-12-23 PROCEDURE — 36415 COLL VENOUS BLD VENIPUNCTURE: CPT

## 2022-12-23 PROCEDURE — 85652 RBC SED RATE AUTOMATED: CPT

## 2022-12-23 PROCEDURE — 84460 ALANINE AMINO (ALT) (SGPT): CPT

## 2022-12-23 PROCEDURE — 85027 COMPLETE CBC AUTOMATED: CPT

## 2022-12-23 PROCEDURE — 82565 ASSAY OF CREATININE: CPT

## 2022-12-23 PROCEDURE — 84450 TRANSFERASE (AST) (SGOT): CPT

## 2022-12-23 PROCEDURE — 82040 ASSAY OF SERUM ALBUMIN: CPT

## 2023-01-18 DIAGNOSIS — M05.79 RHEUMATOID ARTHRITIS, SEROPOSITIVE, MULTIPLE SITES (H): ICD-10-CM

## 2023-01-20 NOTE — TELEPHONE ENCOUNTER
Contacted the Pt to schedule; Pt states she has been rescheduled by Dr. Menezes's office the last two time and cannot wait to be seen in July (next available with Dr. Menezes) for her current medications are not working for her.    Pt has a upcoming appt to get establish with a new rheumatologiston    March 3, 2023 with Dr. Maria Del Carmen Hickman with [facility].    Pt is requesting if a refill can be sent in until she can get in to be seen in March by the new provider?  Please contact the Pt back if there is an issue with refilling medications.

## 2023-01-20 NOTE — TELEPHONE ENCOUNTER
methotrexate 2.5 MG tablet     Last Written Prescription Date:  08-  Last Fill Quantity: 96,   # refills: 1  Last Office Visit  06-  Future Office visit:  Not on file    LABS:  Completed on          12-      Scheduling has been notified to contact the pt for appointment.

## 2023-04-15 ENCOUNTER — HEALTH MAINTENANCE LETTER (OUTPATIENT)
Age: 48
End: 2023-04-15

## 2024-02-25 DIAGNOSIS — M05.79 RHEUMATOID ARTHRITIS, SEROPOSITIVE, MULTIPLE SITES (H): ICD-10-CM

## 2024-02-25 DIAGNOSIS — D70.9 NEUTROPENIA, UNSPECIFIED TYPE (H): ICD-10-CM

## 2024-02-25 DIAGNOSIS — Z79.899 HIGH RISK MEDICATION USE: ICD-10-CM

## 2024-02-29 RX ORDER — FOLIC ACID 1 MG/1
4000 TABLET ORAL DAILY
Qty: 360 TABLET | Refills: 3 | OUTPATIENT
Start: 2024-02-29

## 2024-02-29 NOTE — TELEPHONE ENCOUNTER
Folic Acid 1 MG Oral Tablet      Last Written Prescription Date:  11/9/22  Last Fill Quantity: 360,   # refills: 3  Last Office Visit : 6/21/22  Future Office visit:   7/24/23 Last visit Dr Maria Del Carmen Koch  Patient now follows with [facility]. [facility],         [phone] [facility]

## 2024-04-07 ENCOUNTER — HEALTH MAINTENANCE LETTER (OUTPATIENT)
Age: 49
End: 2024-04-07

## 2025-04-19 ENCOUNTER — HEALTH MAINTENANCE LETTER (OUTPATIENT)
Age: 50
End: 2025-04-19